# Patient Record
Sex: MALE | Race: WHITE | NOT HISPANIC OR LATINO | Employment: OTHER | URBAN - METROPOLITAN AREA
[De-identification: names, ages, dates, MRNs, and addresses within clinical notes are randomized per-mention and may not be internally consistent; named-entity substitution may affect disease eponyms.]

---

## 2017-01-09 ENCOUNTER — CONVERSION ENCOUNTER (OUTPATIENT)
Dept: GASTROENTEROLOGY | Facility: CLINIC | Age: 57
End: 2017-01-09

## 2017-01-09 ENCOUNTER — OFFICE VISIT (OUTPATIENT)
Dept: GASTROENTEROLOGY | Facility: CLINIC | Age: 57
End: 2017-01-09

## 2017-01-09 VITALS
OXYGEN SATURATION: 98 % | SYSTOLIC BLOOD PRESSURE: 160 MMHG | BODY MASS INDEX: 24.22 KG/M2 | WEIGHT: 178.8 LBS | DIASTOLIC BLOOD PRESSURE: 62 MMHG | HEART RATE: 94 BPM | HEIGHT: 72 IN | TEMPERATURE: 99.4 F

## 2017-01-09 DIAGNOSIS — Z12.11 ENCOUNTER FOR SCREENING FOR MALIGNANT NEOPLASM OF COLON: ICD-10-CM

## 2017-01-09 DIAGNOSIS — Z72.0 CURRENT TOBACCO USE: ICD-10-CM

## 2017-01-09 DIAGNOSIS — Z78.9 CURRENT DRINKER OF ALCOHOL: ICD-10-CM

## 2017-01-09 DIAGNOSIS — Z79.1 ENCOUNTER FOR LONG-TERM (CURRENT) USE OF NSAIDS: ICD-10-CM

## 2017-01-09 DIAGNOSIS — Z11.4 ENCOUNTER FOR SCREENING FOR HIV: ICD-10-CM

## 2017-01-09 DIAGNOSIS — K70.30 ALCOHOLIC CIRRHOSIS OF LIVER WITHOUT ASCITES (HCC): ICD-10-CM

## 2017-01-09 DIAGNOSIS — D69.6 THROMBOCYTOPENIA (HCC): ICD-10-CM

## 2017-01-09 DIAGNOSIS — B18.1 HEPATITIS B, CHRONIC (HCC): Primary | ICD-10-CM

## 2017-01-09 PROCEDURE — 99204 OFFICE O/P NEW MOD 45 MIN: CPT | Performed by: NURSE PRACTITIONER

## 2017-01-09 NOTE — MR AVS SNAPSHOT
Kenan Do   1/9/2017 8:30 AM   Office Visit    Dept Phone:  [phone]   Encounter #:  [id number]    Provider:  ANISH Barber   Department:  [facility] GASTROENTEROLOGY                Your Full Care Plan              Today's Medication Changes          These changes are accurate as of: 1/9/17  9:18 AM.  If you have any questions, ask your nurse or doctor.               Stop taking medication(s)listed here:     traMADol 50 MG tablet   Commonly known as:  ULTRAM   Stopped by:  ANISH Barber                      Your Updated Medication List          This list is accurate as of: 1/9/17  9:18 AM.  Always use your most recent med list.                cyclobenzaprine 10 MG tablet   Commonly known as:  FLEXERIL       folic acid 1 MG tablet   Commonly known as:  FOLVITE       furosemide 20 MG tablet   Commonly known as:  LASIX       gabapentin 300 MG capsule   Commonly known as:  NEURONTIN       HYDROcodone-acetaminophen 7.5-325 MG per tablet   Commonly known as:  NORCO       lactulose 10 GM/15ML solution   Commonly known as:  CHRONULAC       magnesium oxide 400 (241.3 MG) MG tablet tablet   Commonly known as:  MAGOX       meloxicam 7.5 MG tablet   Commonly known as:  MOBIC       metFORMIN 500 MG tablet   Commonly known as:  GLUCOPHAGE       MULTIVITAMIN ADULT PO       neomycin 500 MG tablet   Commonly known as:  MYCIFRADIN       omeprazole 20 MG capsule   Commonly known as:  priLOSEC       spironolactone 25 MG tablet   Commonly known as:  ALDACTONE       zolpidem 10 MG tablet   Commonly known as:  AMBIEN               We Performed the Following     AFP tumor marker     Ammonia     CBC and differential     Comprehensive metabolic panel     Ethanol     External [facility] Surgical/Procedural Request     Hepatitis A Antibody, Total     Hepatitis B core antibody, IgM     Hepatitis B core antibody, total     Hepatitis B DNA, ultraquantitative,  PCR     Hepatitis B e antibody     Hepatitis B e antigen     Hepatitis B surface antibody     Hepatitis B surface antigen     Hepatitis C Antibody     HIV-1 / O / 2 Ag / Antibody 4th Generation     Obtain informed consent     Protime-INR     Urine Drug Screen       You Were Diagnosed With        Codes Comments    Hepatitis B, chronic    -  Primary ICD-10-CM: B18.1  ICD-9-CM: 070.32     Encounter for screening for HIV     ICD-10-CM: Z11.4  ICD-9-CM: V73.89       Instructions    Hepatitis B  Hepatitis B is a liver infection. It is caused by a germ. This germ is passed from person to person through:  · Blood.  · Birth.  · Sex.  · Body fluids, like breast milk, tears, and spit (saliva).  HOME CARE  · Rest when you feel tired.  · Avoid alcohol.  · Take medicines only as told by your doctor.  · Do not take any medicine unless your doctor says it is okay. This includes fever or pain medicine.  · Do not have sex unless your doctor says it is okay.  · Do not share toothbrushes, nail clippers, razors, or needles with others.  GET HELP RIGHT AWAY IF:  · You are not able to eat or drink.  · You have a fever and feel sick to your stomach (nauseous) or throw up (vomit).  · You feel confused.  · Your skin or the whites of your eyes look yellow (jaundice).  · You have trouble breathing.  · You get a rash.  · Your skin, throat, mouth, or face becomes puffy (swollen).  · You start twitching or shaking (seizure).  · You are very sleepy and have trouble waking up.  MAKE SURE YOU:   · Understand these instructions.  · Will watch your condition.  · Will get help right away if you are not doing well or get worse.     This information is not intended to replace advice given to you by your health care provider. Make sure you discuss any questions you have with your health care provider.     Document Released: 05/06/2010 Document Revised: 01/08/2016 Document Reviewed: 03/27/2015  Elsevier Interactive Patient Education ©2016 Elsevier  "Inc.       Patient Instructions History      Upcoming Appointments     Visit Type Date Time Department    NEW PATIENT 1/9/2017  8:30 AM [facility] [place]    OUTSIDE FACILITY 3/8/2017  9:30 AM [facility] [place]    OFFICE VISIT 3/22/2017  1:00 PM [facility] [place]      MyChart Signup     Our records indicate that you have declined [facility] MyChart signup. If you would like to sign up for MyChart, please email [email or web address] or call [phone] to obtain an activation code.             Other Info from Your Visit           Your Appointments     Mar 08, 2017  9:30 AM EST   Outside Facility with Junior Dickerson MD   [facility] GASTROENTEROLOGY (--)    [address]  [facility] [facility] [address]   [phone]            Mar 22, 2017  1:00 PM EDT   Office Visit with ANISH Barber   [facility] GASTROENTEROLOGY (--)    [address]  [facility] [address]   [phone]           Arrive 15 minutes prior to appointment.              Allergies     No Known Allergies      Reason for Visit     Low Platelet Count           Vital Signs     Blood Pressure Pulse Temperature Height    160/62 (BP Location: Left arm, Patient Position: Sitting, Cuff Size: Adult) 94 99.4 °F (37.4 °C) (Temporal Artery ) 72\" (182.9 cm)    Weight Oxygen Saturation Body Mass Index Smoking Status    178 lb 12.8 oz (81.1 kg) 98% 24.25 kg/m2 Current Every Day Smoker      Problems and Diagnoses Noted     Chronic hepatitis B    -  Primary        "

## 2017-01-09 NOTE — PROGRESS NOTES
NEW PATIENT NOTE  Patient: DORETHA FERRARO : 1960  Date of Service: 2017  Dear Yoel Boyer and Dr.David Donald Wilson MD   Thank you for your referral of this patient for evaluation of thrombocytopenia  CC: Low Platelet Count  Assessment/Plan                                             ASSESSMENT & PLANS     Alcoholic cirrhosis of liver without ascites. Current drinker of alcohol  Hepatitis B, chronic  Thrombocytopenia related to liver cirrhosis and  long-term (current) use of NSAIDs .  No evidence of decompensation at this time (no jaundice, portal systemic encephalopathy, ascites,  questionable history of variceal bleed)   -     Schedule for EGD to evaluate for varices  -     AFP tumor marker  -     CBC and differential  -     Comprehensive metabolic panel  -     Hepatitis B core antibody, IgM  -     Hepatitis B core antibody, total  -     Hepatitis B DNA, ultraquantitative, PCR  -     Hepatitis B e antibody  -     Hepatitis B e antigen  -     Hepatitis B surface antibody  -     Hepatitis B surface antigen  -     Protime-INR  -     Ultrasound liver; Future  -     Urine Drug Screen  -     Hepatitis A Antibody, Total  -     Hepatitis C Antibody  -     Ethanol  -     Ammonia  -     HIV-1 / O / 2 Ag / Antibody 4th Generation    Encounter for screening for malignant neoplasm of colon  · Schedule for colonoscopy.  No change in bowel habit.  No family history of colon cancer    Current tobacco use    Follow Up: Return for 2 wks after scopes.    DISCUSSION:   Although patient has cut down alcohol consumption, he still consumes alcohol on a regular basis.  I encouraged patient to stop drinking altogether.  I told patient that there is no treatment to cure or reverse his liver cirrhosis.  However, we can try to slow down the progression of liver cirrhosis from developing end-stage liver failure by removing or treating the underlying cause, ie alcohol cessation and treating Hep. I encouraged patient  "to get his hepatitis B treated.  I encouraged patient using protection for sex, avoid multiple partners, and informing his prior sexual partner of his Hep B status.  .    Pt is counseled on avoiding NSAIDS products. I encouraged pt to discuss with PCP to seek alternative approach for mgt such as physical therapy, exercises, muscle relaxants, etc.  Pt is given precaution should pt continue any NSAID product.  Pt was advised that NSAIDS' potential side effects include, but limited to, gastrointestinal irritation including bleeding, thromboycytopenia, and kidney injuries. Pt was asked to take the medication with food and to stop if he experiences any GI upset. Pt is to call me if experiencing vomit, abdominal pain, or black/bloody stools.    In terms of thrombocytopenia, it is related to alcohol & Hep B liver cirrhosis and long-term NSAIDS for which I have counseled patient as mentioned above.  Pt is a high surgical risk.  However, his liver cirrhosis appears to be compensated.  Patient does not have to wait for hepatitis B to be treated prior to hip replacement. Risk vs benefit in the setting of pt's chronic pain.  Transfuse platelet prior to surgery per surgical standard of practice.     The above plan was delineated in details with patient and son and all questions and concerns were answered.  Patient is also given contact information.  Patient is to return as scheduled or sooner if new problems arise   Subjective                                                     SUBJECTIVE   History of Present Illness  Mr. Kenan Do is a 56 y.o. male presents to the clinic today for an evaluation of Low Platelet Count  Pt was told of some kind of low abnormal level before, but not sure if it was \"platelet.\"  Pt has not have a gastroenterologist/hepatologist.  Patient recently evaluated by an orthopedic surgeon Yoel Boyer, for a hip replacement.  Pre-op lab work showed thrombocytopenia.  As the result, pt is referred " "him here for further evaluation.      Pt reports of dx of Hepatitis B since 2003 of unknown source.  Patient denies of knowing how he contracted hepatitis B.  However, he admitted to having unprotected sex in the past.  He has yet been treated for hepatitis B and is not aware that there is medication for hepatitis B treatment.  He also has liver cirrhosis more than 5 yrs.   Used to drink a 5th of whiskey for many yrs. Now he reports of drinking about one case of beer a week.  Not sure of prior blood transfusion.  No tattoos    Hx of GI bleed d/t a \"tear in the esophagus\" 2010.  He underwent an EGD once at that time.  Patient does not recall of having esophageal or gastric varices.  Pt, reportedly, was seen by Dr Spurling, gastroenterologist in [place] while the patient was in the hospital, but not on an outpt basis. Has been taking Mobic daily for many years for arthritis.  Patient also has been taking Prilosec 20 mg once daily. No easy bruising.  Pt denies gum bleed, hemoptysis, hematemesis, or gross hematuria.  Pt denies SOB, chest pain, of dictation, dizziness, vertigo, syncope, fall, or near fall.Pt denies any abdominal pain, including right upper quadrant abdominal pain.  Pt denies jaundice, pruritus, stool or urine color changes, palmar erythema, or any skin changes.  He is able to have daily BMs, with occasional constipation, which he takes OTC laxative with symptoms relief.  No change in bowel habits. Pt denies dark black stools or bright red blood in the stools, in the toilet bowl, or on the toilet tissue.  No diarrhea.  Stool character & consistency have been normal. Never had screening colonoscopy.  He reports of taking lactulose PRN for constipation and not for portosystemic encephalopathy    He has an unintentional weight loss 10 lbs recently d/t not being as active due hip pain.  He previously weighed 186-187 pounds.  Today he weighed 178 pounds. Per outside medical records from PCP, Dr. Wilson, " patient's 50,000 on 3/21/16 without anemia with H&H at 13.9/41.3    ROS:Review of Systems   Constitutional: Positive for activity change, appetite change (decrease) and unexpected weight change (loss). Negative for fatigue and fever.   HENT: Negative for hearing loss, trouble swallowing and voice change.    Eyes: Negative for visual disturbance.   Respiratory: Negative for cough, choking, chest tightness and shortness of breath.    Cardiovascular: Negative for chest pain.   Gastrointestinal: Negative for abdominal distention, abdominal pain, anal bleeding, blood in stool, constipation, diarrhea, nausea, rectal pain and vomiting.   Endocrine: Negative for polydipsia and polyphagia.   Genitourinary: Negative.    Musculoskeletal: Positive for gait problem. Negative for joint swelling.   Skin: Negative for color change and rash.   Allergic/Immunologic: Negative for food allergies.   Neurological: Negative for dizziness, seizures and speech difficulty.   Hematological: Negative for adenopathy.   Psychiatric/Behavioral: Positive for sleep disturbance. Negative for confusion.     PAST MED HX: Pt  has a past medical history of Alcohol abuse; Arthritis; Cirrhosis; Constipation; Degenerative disc disease, lumbar; Diabetes mellitus (2013); GERD (gastroesophageal reflux disease); Hepatitis; Hip pain; History of anemia as a child; History of seizures; History of transfusion; Insomnia; and Wears glasses.  PAST SURG HX: Pt  has a past surgical history that includes Knee arthroscopy (Left).  FAM HX: Family history is unknown by patient.  SOC HX: Pt  reports that he has been smoking Cigarettes since age 18 with about 2 packs a wk.  He has a 19.00 pack-year smoking history. He has quit using smokeless tobacco. He is drinking about one case of beer a week. He reports that he does not use illicit drugs.  Objective                                                           OBJECTIVE   MEDS: •  cyclobenzaprine (FLEXERIL) 10 MG tablet,  Take 10 mg by mouth Daily., Disp: , Rfl:   •  folic acid (FOLVITE) 1 MG tablet, Take 1 mg by mouth Daily., Disp: , Rfl:   •  furosemide (LASIX) 20 MG tablet, Take 20 mg by mouth Daily., Disp: , Rfl:   •  gabapentin (NEURONTIN) 300 MG capsule, Take 300 mg by mouth 3 (Three) Times a Day., Disp: , Rfl:   •  HYDROcodone-acetaminophen (NORCO) 7.5-325 MG per tablet, Take 1 tablet by mouth Every 6 (Six) Hours As Needed for moderate pain (4-6)., Disp: , Rfl:   •  lactulose (CHRONULAC) 10 GM/15ML solution, Take 20 g by mouth 2 (Two) Times a Day As Needed., Disp: , Rfl:   •  magnesium oxide (MAGOX) 400 (241.3 MG) MG tablet tablet, Take 400 mg by mouth 2 (Two) Times a Day., Disp: , Rfl:   •  meloxicam (MOBIC) 7.5 MG tablet, Take 7.5 mg by mouth Daily., Disp: , Rfl:   •  metFORMIN (GLUCOPHAGE) 500 MG tablet, Take 500 mg by mouth Daily With Breakfast., Disp: , Rfl:   •  Multiple Vitamins-Minerals (MULTIVITAMIN ADULT PO), Take 1 tablet by mouth Daily., Disp: , Rfl:   •  neomycin (MYCIFRADIN) 500 MG tablet, Take 500 mg by mouth 2 (Two) Times a Day., Disp: , Rfl:   •  omeprazole (priLOSEC) 20 MG capsule, Take 20 mg by mouth Daily., Disp: , Rfl:   •  spironolactone (ALDACTONE) 25 MG tablet, Take 25 mg by mouth Daily., Disp: , Rfl:   •  zolpidem (AMBIEN) 10 MG tablet, Take 10 mg by mouth At Night As Needed for sleep., Disp: , Rfl:   Lab Results   Component Value Date    WBC 3.59 12/27/2016    HGB 13.8 12/27/2016    HCT 37.5 (L) 12/27/2016    MCV 94.9 12/27/2016    PLT 68 (L) 12/27/2016   Per outside medical record from [facility] in [place], blood work on 3/21/16: WBC 4.0 H&H 13.9/41.3 MCV 95.6 PLT 50K    Lab Results   Component Value Date    AST 77 (H) 12/27/2016    ALT 39 12/27/2016    ALKPHOS 135 (H) 12/27/2016    BILITOT 2.0 (H) 12/27/2016    ALBUMIN 4.50 12/27/2016   Per outside medical record from [facility] in [place], blood work on 3/21/16:  ALT 54 alkaline phosphatase 141 total bili 1.5  albumin 5.4 total protein 7.7 triglycerides 78 total cholesterol 224  LDL 85 serum creatinine 0.61 BUN 7 sodium 141 potassium 4.2 chloride 102, dioxide 25 calcium 9.8 uric acid 4.9    Lab Results   Component Value Date     12/27/2016    K 4.4 12/27/2016     12/27/2016    CO2 27.0 12/27/2016    BUN 11 12/27/2016    CREATININE 0.50 (L) 12/27/2016    GLUCOSE 134 (H) 12/27/2016    CALCIUM 10.9 (H) 12/27/2016    ANIONGAP 9.0 12/27/2016     Lab Results   Component Value Date    HGBA1C 4.80 12/27/2016     Wt Readings from Last 3 Encounters:   01/09/17 178 lb 12.8 oz (81.1 kg)   12/27/16 180 lb (81.6 kg)   body mass index is 24.25 kg/(m^2).,Temp: 99.4 °F (37.4 °C),BP: 160/62,Heart Rate: 94   Physical Exam  General chronically ill appearing  male who appears older than stated age.  Patient uses a wheelchair for ambulation; no acute distress.   ENT poor dentition.  Oral mucosa pink & dry without thrush or lesions.    Neck Neck supple; trachea midline. No thyromegaly   Resp CTA; no rhonchi, rales, or wheezes.  Respiration effort normal  CV RRR; normal S1, S2; no M/R/G. No lower extremity edema  GI Abd soft, NT, ND, normal active bowel sounds.  No HSM.  No abd hernia  Skin No rash; no lesions; no bruises.  Skin turgor normal  Musc No clubbing; no cyanosis.  Gait steady  Psych Oriented to time, place, and person.  Appropriate affect  Thank you kindly for allowing me to be part of this patient’s care.    Pt care team: Tawnya OCONNELL & Andi Chaudhari, [facility]--Gastroenterology  [phone]    CC: Yoel Boyer from [facility]    Dr.David Donald Wilson MD  [address] [place] [address] [address] / [place] [id number] FAX:[phone]

## 2017-01-09 NOTE — PATIENT INSTRUCTIONS
Avoid NSAIDs (Non-Steroid Anti-Inflammatory Drugs) products (i.e Ibuprofen, Aleve, Advil, Exedrin, BC Powder, diclofenac, meloxicam, & Naproxen, etc). Talk to your doctor/medical provider for alternative approach for management such as physical therapy, exercises, muscle relaxants, etc.     In some cases, your doctor will have to keep you on NSAIDS products to treat your other medical conditions.  Should you need to continue any NSAID product,  please continue to take your acid reducer.  Be be aware of long-term and frequent NSAIDS' potential side effects. These include, but limited to, stomach ulcer, bleeding risks, and kidney injury. Take the medication with food and to stop if you experience any GI upset. Call if you have vomitting, abdominal pain, or black/bloody stools.     Hepatitis B  Hepatitis B is a liver infection. It is caused by a germ. This germ is passed from person to person through:  · Blood.  · Birth.  · Sex.  · Body fluids, like breast milk, tears, and spit (saliva).  HOME CARE  · Rest when you feel tired.  · Avoid alcohol.  · Take medicines only as told by your doctor.  · Do not take any medicine unless your doctor says it is okay. This includes fever or pain medicine.  · Do not have sex unless your doctor says it is okay.  · Do not share toothbrushes, nail clippers, razors, or needles with others.  GET HELP RIGHT AWAY IF:  · You are not able to eat or drink.  · You have a fever and feel sick to your stomach (nauseous) or throw up (vomit).  · You feel confused.  · Your skin or the whites of your eyes look yellow (jaundice).  · You have trouble breathing.  · You get a rash.  · Your skin, throat, mouth, or face becomes puffy (swollen).  · You start twitching or shaking (seizure).  · You are very sleepy and have trouble waking up.  MAKE SURE YOU:   · Understand these instructions.  · Will watch your condition.  · Will get help right away if you are not doing well or get worse.     This information  is not intended to replace advice given to you by your health care provider. Make sure you discuss any questions you have with your health care provider.     Document Released: 05/06/2010 Document Revised: 01/08/2016 Document Reviewed: 03/27/2015  [employer] Interactive Patient Education ©2016 Elsevier Inc.

## 2017-01-09 NOTE — LETTER
2017     William Wilson MD  [address] [facility]   [address]  [facility] [address]    Patient: Kenan Ferraro   YOB: 1960   Date of Visit: 2017       Dear Dr. Steve MD:    Thank you for referring Kenan Ferraro to me for evaluation. Below is a copy of my consult note.    If you have questions, please do not hesitate to call me. I look forward to following Kenan along with you.         Sincerely,        ANISH Barber        CC: Yoel Brewster MD        NEW PATIENT NOTE  Patient: KENAN FERRARO : 1960  Date of Service: 2017  Dear Yoel Boyer and Dr.David Donald Wilson MD   Thank you for your referral of this patient for evaluation of thrombocytopenia  CC: Low Platelet Count  Assessment/Plan                                             ASSESSMENT & PLANS     Alcoholic cirrhosis of liver without ascites. Current drinker of alcohol  Hepatitis B, chronic  Thrombocytopenia related to liver cirrhosis and  long-term (current) use of NSAIDs .  No evidence of decompensation at this time (no jaundice, portal systemic encephalopathy, ascites,  questionable history of variceal bleed)   -     Schedule for EGD to evaluate for varices  -     AFP tumor marker  -     CBC and differential  -     Comprehensive metabolic panel  -     Hepatitis B core antibody, IgM  -     Hepatitis B core antibody, total  -     Hepatitis B DNA, ultraquantitative, PCR  -     Hepatitis B e antibody  -     Hepatitis B e antigen  -     Hepatitis B surface antibody  -     Hepatitis B surface antigen  -     Protime-INR  -     Ultrasound liver; Future  -     Urine Drug Screen  -     Hepatitis A Antibody, Total  -     Hepatitis C Antibody  -     Ethanol  -     Ammonia  -     HIV-1 / O / 2 Ag / Antibody 4th Generation    Encounter for screening for malignant neoplasm of colon  · Schedule for colonoscopy.  No change in bowel habit.  No family history of colon cancer    Current tobacco  use    Follow Up: Return for 2 wks after scopes.    DISCUSSION:   Although patient has cut down alcohol consumption, he still consumes alcohol on a regular basis.  I encouraged patient to stop drinking altogether.  I told patient that there is no treatment to cure or reverse his liver cirrhosis.  However, we can try to slow down the progression of liver cirrhosis from developing end-stage liver failure by removing or treating the underlying cause, ie alcohol cessation and treating Hep. I encouraged patient to get his hepatitis B treated.  I encouraged patient using protection for sex, avoid multiple partners, and informing his prior sexual partner of his Hep B status.  .    Pt is counseled on avoiding NSAIDS products. I encouraged pt to discuss with PCP to seek alternative approach for mgt such as physical therapy, exercises, muscle relaxants, etc.  Pt is given precaution should pt continue any NSAID product.  Pt was advised that NSAIDS' potential side effects include, but limited to, gastrointestinal irritation including bleeding, thromboycytopenia, and kidney injuries. Pt was asked to take the medication with food and to stop if he experiences any GI upset. Pt is to call me if experiencing vomit, abdominal pain, or black/bloody stools.    In terms of thrombocytopenia, it is related to alcohol & Hep B liver cirrhosis and long-term NSAIDS for which I have counseled patient as mentioned above.  Pt is a high surgical risk.  However, his liver cirrhosis appears to be compensated.  Patient does not have to wait for hepatitis B to be treated prior to hip replacement. Risk vs benefit in the setting of pt's chronic pain.  Transfuse platelet prior to surgery per surgical standard of practice.     The above plan was delineated in details with patient and son and all questions and concerns were answered.  Patient is also given contact information.  Patient is to return as scheduled or sooner if new problems arise   Subjective  "                                                    SUBJECTIVE   History of Present Illness  Mr. Kenan Do is a 56 y.o. male presents to the clinic today for an evaluation of Low Platelet Count  Pt was told of some kind of low abnormal level before, but not sure if it was \"platelet.\"  Pt has not have a gastroenterologist/hepatologist.  Patient recently evaluated by an orthopedic surgeon Yoel Boyer, for a hip replacement.  Pre-op lab work showed thrombocytopenia.  As the result, pt is referred him here for further evaluation.      Pt reports of dx of Hepatitis B since 2003 of unknown source.  Patient denies of knowing how he contracted hepatitis B.  However, he admitted to having unprotected sex in the past.  He has yet been treated for hepatitis B and is not aware that there is medication for hepatitis B treatment.  He also has liver cirrhosis more than 5 yrs.   Used to drink a 5th of whiskey for many yrs. Now he reports of drinking about one case of beer a week.  Not sure of prior blood transfusion.  No tattoos    Hx of GI bleed d/t a \"tear in the esophagus\" 2010.  He underwent an EGD once at that time.  Patient does not recall of having esophageal or gastric varices.  Pt, reportedly, was seen by Dr Spurling, gastroenterologist in [place] while the patient was in the hospital, but not on an outpt basis. Has been taking Mobic daily for many years for arthritis.  Patient also has been taking Prilosec 20 mg once daily. No easy bruising.  Pt denies gum bleed, hemoptysis, hematemesis, or gross hematuria.  Pt denies SOB, chest pain, of dictation, dizziness, vertigo, syncope, fall, or near fall.Pt denies any abdominal pain, including right upper quadrant abdominal pain.  Pt denies jaundice, pruritus, stool or urine color changes, palmar erythema, or any skin changes.  He is able to have daily BMs, with occasional constipation, which he takes OTC laxative with symptoms relief.  No change in bowel habits. Pt " denies dark black stools or bright red blood in the stools, in the toilet bowl, or on the toilet tissue.  No diarrhea.  Stool character & consistency have been normal. Never had screening colonoscopy.  He reports of taking lactulose PRN for constipation and not for portosystemic encephalopathy    He has an unintentional weight loss 10 lbs recently d/t not being as active due hip pain.  He previously weighed 186-187 pounds.  Today he weighed 178 pounds. Per outside medical records from PCP, Dr. Wilson, patient's 50,000 on 3/21/16 without anemia with H&H at 13.9/41.3    ROS:Review of Systems   Constitutional: Positive for activity change, appetite change (decrease) and unexpected weight change (loss). Negative for fatigue and fever.   HENT: Negative for hearing loss, trouble swallowing and voice change.    Eyes: Negative for visual disturbance.   Respiratory: Negative for cough, choking, chest tightness and shortness of breath.    Cardiovascular: Negative for chest pain.   Gastrointestinal: Negative for abdominal distention, abdominal pain, anal bleeding, blood in stool, constipation, diarrhea, nausea, rectal pain and vomiting.   Endocrine: Negative for polydipsia and polyphagia.   Genitourinary: Negative.    Musculoskeletal: Positive for gait problem. Negative for joint swelling.   Skin: Negative for color change and rash.   Allergic/Immunologic: Negative for food allergies.   Neurological: Negative for dizziness, seizures and speech difficulty.   Hematological: Negative for adenopathy.   Psychiatric/Behavioral: Positive for sleep disturbance. Negative for confusion.     PAST MED HX: Pt  has a past medical history of Alcohol abuse; Arthritis; Cirrhosis; Constipation; Degenerative disc disease, lumbar; Diabetes mellitus (2013); GERD (gastroesophageal reflux disease); Hepatitis; Hip pain; History of anemia as a child; History of seizures; History of transfusion; Insomnia; and Wears glasses.  PAST SURG HX: Pt  has a  past surgical history that includes Knee arthroscopy (Left).  FAM HX: Family history is unknown by patient.  SOC HX: Pt  reports that he has been smoking Cigarettes since age 18 with about 2 packs a wk.  He has a 19.00 pack-year smoking history. He has quit using smokeless tobacco. He is drinking about one case of beer a week. He reports that he does not use illicit drugs.  Objective                                                           OBJECTIVE   MEDS: •  cyclobenzaprine (FLEXERIL) 10 MG tablet, Take 10 mg by mouth Daily., Disp: , Rfl:   •  folic acid (FOLVITE) 1 MG tablet, Take 1 mg by mouth Daily., Disp: , Rfl:   •  furosemide (LASIX) 20 MG tablet, Take 20 mg by mouth Daily., Disp: , Rfl:   •  gabapentin (NEURONTIN) 300 MG capsule, Take 300 mg by mouth 3 (Three) Times a Day., Disp: , Rfl:   •  HYDROcodone-acetaminophen (NORCO) 7.5-325 MG per tablet, Take 1 tablet by mouth Every 6 (Six) Hours As Needed for moderate pain (4-6)., Disp: , Rfl:   •  lactulose (CHRONULAC) 10 GM/15ML solution, Take 20 g by mouth 2 (Two) Times a Day As Needed., Disp: , Rfl:   •  magnesium oxide (MAGOX) 400 (241.3 MG) MG tablet tablet, Take 400 mg by mouth 2 (Two) Times a Day., Disp: , Rfl:   •  meloxicam (MOBIC) 7.5 MG tablet, Take 7.5 mg by mouth Daily., Disp: , Rfl:   •  metFORMIN (GLUCOPHAGE) 500 MG tablet, Take 500 mg by mouth Daily With Breakfast., Disp: , Rfl:   •  Multiple Vitamins-Minerals (MULTIVITAMIN ADULT PO), Take 1 tablet by mouth Daily., Disp: , Rfl:   •  neomycin (MYCIFRADIN) 500 MG tablet, Take 500 mg by mouth 2 (Two) Times a Day., Disp: , Rfl:   •  omeprazole (priLOSEC) 20 MG capsule, Take 20 mg by mouth Daily., Disp: , Rfl:   •  spironolactone (ALDACTONE) 25 MG tablet, Take 25 mg by mouth Daily., Disp: , Rfl:   •  zolpidem (AMBIEN) 10 MG tablet, Take 10 mg by mouth At Night As Needed for sleep., Disp: , Rfl:   Lab Results   Component Value Date    WBC 3.59 12/27/2016    HGB 13.8 12/27/2016    HCT 37.5 (L)  12/27/2016    MCV 94.9 12/27/2016    PLT 68 (L) 12/27/2016   Per outside medical record from [facility] in [place], blood work on 3/21/16: WBC 4.0 H&H 13.9/41.3 MCV 95.6 PLT 50K    Lab Results   Component Value Date    AST 77 (H) 12/27/2016    ALT 39 12/27/2016    ALKPHOS 135 (H) 12/27/2016    BILITOT 2.0 (H) 12/27/2016    ALBUMIN 4.50 12/27/2016   Per outside medical record from [facility] in [place], blood work on 3/21/16:  ALT 54 alkaline phosphatase 141 total bili 1.5 albumin 5.4 total protein 7.7 triglycerides 78 total cholesterol 224  LDL 85 serum creatinine 0.61 BUN 7 sodium 141 potassium 4.2 chloride 102, dioxide 25 calcium 9.8 uric acid 4.9    Lab Results   Component Value Date     12/27/2016    K 4.4 12/27/2016     12/27/2016    CO2 27.0 12/27/2016    BUN 11 12/27/2016    CREATININE 0.50 (L) 12/27/2016    GLUCOSE 134 (H) 12/27/2016    CALCIUM 10.9 (H) 12/27/2016    ANIONGAP 9.0 12/27/2016     Lab Results   Component Value Date    HGBA1C 4.80 12/27/2016     Wt Readings from Last 3 Encounters:   01/09/17 178 lb 12.8 oz (81.1 kg)   12/27/16 180 lb (81.6 kg)   body mass index is 24.25 kg/(m^2).,Temp: 99.4 °F (37.4 °C),BP: 160/62,Heart Rate: 94   Physical Exam  General chronically ill appearing  male who appears older than stated age.  Patient uses a wheelchair for ambulation; no acute distress.   ENT poor dentition.  Oral mucosa pink & dry without thrush or lesions.    Neck Neck supple; trachea midline. No thyromegaly   Resp CTA; no rhonchi, rales, or wheezes.  Respiration effort normal  CV RRR; normal S1, S2; no M/R/G. No lower extremity edema  GI Abd soft, NT, ND, normal active bowel sounds.  No HSM.  No abd hernia  Skin No rash; no lesions; no bruises.  Skin turgor normal  Musc No clubbing; no cyanosis.  Gait steady  Psych Oriented to time, place, and person.  Appropriate affect  Thank you kindly for allowing me to be part of this patient’s  care.    Pt care team: Tawnya OCONNELL & TATA Smith  [facility]--Gastroenterology  [phone]    CC: Yoel Boyer from [facility]    Dr.David Donald Wilson MD  [address] [place] [address] DR [address]ET[address] [facility] / [place] [id number] FAX:[phone]

## 2017-01-10 LAB
AMPHETAMINES UR QL SCN: NEGATIVE NG/ML
BARBITURATES UR QL SCN: NEGATIVE NG/ML
BENZODIAZ UR QL SCN: NEGATIVE NG/ML
BZE UR QL SCN: NEGATIVE NG/ML
CANNABINOIDS UR QL SCN: NEGATIVE NG/ML
Lab: ABNORMAL
MDMA UR QL SCN: NEGATIVE NG/ML
METHADONE UR QL SCN: NEGATIVE NG/ML
OPIATES UR QL SCN: POSITIVE NG/ML
OXYCODONE+OXYMORPHONE UR QL SCN: NEGATIVE NG/ML
PCP UR QL SCN: NEGATIVE NG/ML

## 2017-01-11 LAB
AFP-TM SERPL-MCNC: 4.6 NG/ML (ref 0–8.3)
HAV AB SER QL IA: POSITIVE
HBV CORE AB SERPL QL IA: NEGATIVE
HBV CORE IGM SERPL QL IA: NEGATIVE
HBV DNA SERPL NAA+PROBE-ACNC: NORMAL IU/ML
HBV DNA SERPL NAA+PROBE-LOG IU: NORMAL LOG10IU/ML
HBV E AB SERPL QL IA: NEGATIVE
HBV E AG SERPL QL IA: NEGATIVE
HBV SURFACE AB SER QL: NON REACTIVE
HBV SURFACE AG SERPL QL IA: NEGATIVE
HCV AB S/CO SERPL IA: <0.1 S/CO RATIO (ref 0–0.9)
REF LAB TEST REF RANGE: NORMAL

## 2017-01-12 LAB
ALBUMIN SERPL-MCNC: 4.3 G/DL (ref 3.2–4.8)
ALBUMIN/GLOB SERPL: 1.6 G/DL
ALP SERPL-CCNC: 121 U/L (ref 25–100)
ALT SERPL-CCNC: 37 U/L (ref 7–40)
AMMONIA PLAS-MCNC: 31 UMOL/L (ref 19–60)
AST SERPL-CCNC: 75 U/L (ref 0–33)
BASOPHILS # BLD AUTO: 0.02 10E3/MM3 (ref 0–0.2)
BASOPHILS NFR BLD AUTO: 0.5 % (ref 0–1)
BILIRUB SERPL-MCNC: 2 MG/DL (ref 0.3–1.2)
BUN SERPL-MCNC: 7 MG/DL (ref 9–23)
BUN/CREAT SERPL: 14 (ref 7–25)
CALCIUM SERPL-MCNC: 10.3 MG/DL (ref 8.7–10.4)
CHLORIDE SERPL-SCNC: 104 MMOL/L (ref 99–109)
CO2 SERPL-SCNC: 26 MMOL/L (ref 20–31)
CREAT SERPL-MCNC: 0.5 MG/DL (ref 0.6–1.3)
EOSINOPHIL # BLD AUTO: 0.05 10E3/MM3 (ref 0.1–0.3)
EOSINOPHIL NFR BLD AUTO: 1.3 % (ref 0–3)
ERYTHROCYTE [DISTWIDTH] IN BLOOD BY AUTOMATED COUNT: 13 % (ref 11.3–14.5)
ETHANOL BLD GC-MCNC: NORMAL %
GLOBULIN SER CALC-MCNC: 2.7 G/DL
GLUCOSE SERPL-MCNC: 109 MG/DL (ref 70–100)
HCT VFR BLD AUTO: 36.6 % (ref 38.9–50.9)
HGB BLD-MCNC: 13.6 G/DL (ref 13.1–17.5)
IMM GRANULOCYTES # BLD: 0 10E3/MM3 (ref 0–0.03)
IMM GRANULOCYTES NFR BLD: 0 % (ref 0–0.6)
INR PPP: 1.08
LYMPHOCYTES # BLD AUTO: 0.74 10E3/MM3 (ref 0.6–4.8)
LYMPHOCYTES NFR BLD AUTO: 19.8 % (ref 24–44)
MCH RBC QN AUTO: 35.3 PG (ref 27–31)
MCHC RBC AUTO-ENTMCNC: 37.2 G/DL (ref 32–36)
MCV RBC AUTO: 95.1 FL (ref 80–99)
MONOCYTES # BLD AUTO: 0.49 10E3/MM3 (ref 0–1)
MONOCYTES NFR BLD AUTO: 13.1 % (ref 0–12)
NEUTROPHILS # BLD AUTO: 2.44 10E3/MM3 (ref 1.5–8.3)
NEUTROPHILS NFR BLD AUTO: 65.3 % (ref 41–71)
PLATELET # BLD AUTO: 61 10E3/MM3 (ref 150–450)
POTASSIUM SERPL-SCNC: 3.7 MMOL/L (ref 3.5–5.5)
PROT SERPL-MCNC: 7 G/DL (ref 5.7–8.2)
PROTHROMBIN TIME: 11.8 SECONDS (ref 9.6–11.5)
RBC # BLD AUTO: 3.85 10E6/MM3 (ref 4.2–5.76)
SODIUM SERPL-SCNC: 142 MMOL/L (ref 132–146)
WBC # BLD AUTO: 3.74 10E3/MM3 (ref 3.5–10.8)

## 2017-01-19 ENCOUNTER — TELEPHONE (OUTPATIENT)
Dept: GASTROENTEROLOGY | Facility: CLINIC | Age: 57
End: 2017-01-19

## 2017-01-19 NOTE — TELEPHONE ENCOUNTER
Pt is made aware of his severe thrombocytopenia persists (though not worsened compared to prior reading) and that he is to notify of there is any evidence of an, suggest gross hematuria, but his stool, epistaxis, hematemesis, uncontrolled bleeding, etc.      Patient reports that he wants to get his hip surgery for each prior to doing endoscopy procedures due to his chronic hip pain.  I again advised patient to follow up with Dr. Brewster. Pt is encouraged to keep up with his EGD and colonoscopy appts and get them done ASAP after hip surgery.    Pt verbalized understanding

## 2017-03-01 ENCOUNTER — APPOINTMENT (OUTPATIENT)
Dept: PREADMISSION TESTING | Facility: HOSPITAL | Age: 57
End: 2017-03-01

## 2017-03-01 VITALS — HEIGHT: 72 IN | BODY MASS INDEX: 23.92 KG/M2 | WEIGHT: 176.59 LBS

## 2017-03-01 LAB
ALBUMIN SERPL-MCNC: 4.3 G/DL (ref 3.2–4.8)
ALBUMIN/GLOB SERPL: 1.7 G/DL (ref 1.5–2.5)
ALP SERPL-CCNC: 106 U/L (ref 25–100)
ALT SERPL W P-5'-P-CCNC: 37 U/L (ref 7–40)
ANION GAP SERPL CALCULATED.3IONS-SCNC: 5 MMOL/L (ref 3–11)
AST SERPL-CCNC: 55 U/L (ref 0–33)
BILIRUB SERPL-MCNC: 3.3 MG/DL (ref 0.3–1.2)
BUN BLD-MCNC: 7 MG/DL (ref 9–23)
BUN/CREAT SERPL: 14 (ref 7–25)
CALCIUM SPEC-SCNC: 10.7 MG/DL (ref 8.7–10.4)
CHLORIDE SERPL-SCNC: 100 MMOL/L (ref 99–109)
CO2 SERPL-SCNC: 30 MMOL/L (ref 20–31)
CREAT BLD-MCNC: 0.5 MG/DL (ref 0.6–1.3)
DEPRECATED RDW RBC AUTO: 41 FL (ref 37–54)
ERYTHROCYTE [DISTWIDTH] IN BLOOD BY AUTOMATED COUNT: 11.9 % (ref 11.3–14.5)
GFR SERPL CREATININE-BSD FRML MDRD: >150 ML/MIN/1.73
GLOBULIN UR ELPH-MCNC: 2.5 GM/DL
GLUCOSE BLD-MCNC: 128 MG/DL (ref 70–100)
HBA1C MFR BLD: 4.8 % (ref 4.8–5.6)
HCT VFR BLD AUTO: 36.7 % (ref 38.9–50.9)
HGB BLD-MCNC: 13.1 G/DL (ref 13.1–17.5)
MCH RBC QN AUTO: 33.9 PG (ref 27–31)
MCHC RBC AUTO-ENTMCNC: 35.7 G/DL (ref 32–36)
MCV RBC AUTO: 94.8 FL (ref 80–99)
PLATELET # BLD AUTO: 64 10*3/MM3 (ref 150–450)
PMV BLD AUTO: 9.8 FL (ref 6–12)
POTASSIUM BLD-SCNC: 3.8 MMOL/L (ref 3.5–5.5)
PROT SERPL-MCNC: 6.8 G/DL (ref 5.7–8.2)
RBC # BLD AUTO: 3.87 10*6/MM3 (ref 4.2–5.76)
SODIUM BLD-SCNC: 135 MMOL/L (ref 132–146)
WBC NRBC COR # BLD: 3.35 10*3/MM3 (ref 3.5–10.8)

## 2017-03-01 PROCEDURE — 85027 COMPLETE CBC AUTOMATED: CPT | Performed by: ORTHOPAEDIC SURGERY

## 2017-03-01 PROCEDURE — 80053 COMPREHEN METABOLIC PANEL: CPT | Performed by: ORTHOPAEDIC SURGERY

## 2017-03-01 PROCEDURE — 36415 COLL VENOUS BLD VENIPUNCTURE: CPT

## 2017-03-01 PROCEDURE — 83036 HEMOGLOBIN GLYCOSYLATED A1C: CPT | Performed by: ORTHOPAEDIC SURGERY

## 2017-03-01 RX ORDER — LACTULOSE 10 G/15ML
20 SOLUTION ORAL 2 TIMES DAILY PRN
COMMUNITY

## 2017-03-01 RX ORDER — ZOLPIDEM TARTRATE 10 MG/1
10 TABLET ORAL NIGHTLY PRN
COMMUNITY

## 2017-03-01 RX ORDER — HYDROCODONE BITARTRATE AND ACETAMINOPHEN 7.5; 325 MG/1; MG/1
1 TABLET ORAL EVERY 6 HOURS PRN
COMMUNITY
End: 2017-03-07 | Stop reason: HOSPADM

## 2017-03-01 RX ORDER — NEOMYCIN SULFATE 500 MG/1
500 TABLET ORAL 2 TIMES DAILY
COMMUNITY

## 2017-03-01 RX ORDER — OMEPRAZOLE 20 MG/1
20 CAPSULE, DELAYED RELEASE ORAL DAILY
COMMUNITY

## 2017-03-01 RX ORDER — SPIRONOLACTONE 25 MG/1
25 TABLET ORAL DAILY
COMMUNITY

## 2017-03-01 RX ORDER — FOLIC ACID 1 MG/1
1 TABLET ORAL DAILY
COMMUNITY

## 2017-03-01 RX ORDER — GABAPENTIN 300 MG/1
300 CAPSULE ORAL 3 TIMES DAILY
COMMUNITY

## 2017-03-01 RX ORDER — CYCLOBENZAPRINE HCL 10 MG
10 TABLET ORAL 3 TIMES DAILY PRN
COMMUNITY

## 2017-03-01 RX ORDER — FUROSEMIDE 20 MG/1
20 TABLET ORAL DAILY
COMMUNITY

## 2017-03-01 NOTE — PAT
Too early for type and screen; please draw the morning of surgery   Fazal gil measurements:  Length: 23  Width: 14  Patient attended joint class in Dec 2016  Patient denies any dysuria, flank pain, or urine frequency

## 2017-03-05 ENCOUNTER — ANESTHESIA EVENT (OUTPATIENT)
Dept: PERIOP | Facility: HOSPITAL | Age: 57
End: 2017-03-05

## 2017-03-05 RX ORDER — FAMOTIDINE 10 MG/ML
20 INJECTION, SOLUTION INTRAVENOUS ONCE
Status: CANCELLED | OUTPATIENT
Start: 2017-03-05 | End: 2017-03-05

## 2017-03-06 ENCOUNTER — ANESTHESIA (OUTPATIENT)
Dept: PERIOP | Facility: HOSPITAL | Age: 57
End: 2017-03-06

## 2017-03-06 ENCOUNTER — APPOINTMENT (OUTPATIENT)
Dept: GENERAL RADIOLOGY | Facility: HOSPITAL | Age: 57
End: 2017-03-06

## 2017-03-06 ENCOUNTER — HOSPITAL ENCOUNTER (INPATIENT)
Facility: HOSPITAL | Age: 57
LOS: 1 days | Discharge: HOME HEALTH | End: 2017-03-07
Attending: ORTHOPAEDIC SURGERY | Admitting: ORTHOPAEDIC SURGERY

## 2017-03-06 DIAGNOSIS — Z96.641 STATUS POST TOTAL HIP REPLACEMENT, RIGHT: ICD-10-CM

## 2017-03-06 DIAGNOSIS — Z74.09 IMPAIRED MOBILITY AND ADLS: ICD-10-CM

## 2017-03-06 DIAGNOSIS — Z74.09 IMPAIRED FUNCTIONAL MOBILITY, BALANCE, GAIT, AND ENDURANCE: Primary | ICD-10-CM

## 2017-03-06 DIAGNOSIS — Z78.9 IMPAIRED MOBILITY AND ADLS: ICD-10-CM

## 2017-03-06 PROBLEM — K74.60 CIRRHOSIS (HCC): Status: ACTIVE | Noted: 2017-03-06

## 2017-03-06 PROBLEM — M16.11 ARTHRITIS OF RIGHT HIP: Status: ACTIVE | Noted: 2017-03-06

## 2017-03-06 PROBLEM — Z72.0 TOBACCO ABUSE: Status: ACTIVE | Noted: 2017-03-06

## 2017-03-06 PROBLEM — K21.9 GERD (GASTROESOPHAGEAL REFLUX DISEASE): Status: ACTIVE | Noted: 2017-03-06

## 2017-03-06 PROBLEM — F10.10 ALCOHOL ABUSE: Status: ACTIVE | Noted: 2017-03-06

## 2017-03-06 PROBLEM — E11.9 DM (DIABETES MELLITUS) (HCC): Status: ACTIVE | Noted: 2017-03-06

## 2017-03-06 LAB
ABO GROUP BLD: NORMAL
ANION GAP SERPL CALCULATED.3IONS-SCNC: 10 MMOL/L (ref 3–11)
BLD GP AB SCN SERPL QL: NEGATIVE
BUN BLD-MCNC: 6 MG/DL (ref 9–23)
BUN/CREAT SERPL: 12 (ref 7–25)
CALCIUM SPEC-SCNC: 9.2 MG/DL (ref 8.7–10.4)
CHLORIDE SERPL-SCNC: 106 MMOL/L (ref 99–109)
CO2 SERPL-SCNC: 23 MMOL/L (ref 20–31)
CREAT BLD-MCNC: 0.5 MG/DL (ref 0.6–1.3)
DEPRECATED RDW RBC AUTO: 43.7 FL (ref 37–54)
ERYTHROCYTE [DISTWIDTH] IN BLOOD BY AUTOMATED COUNT: 12.3 % (ref 11.3–14.5)
FIBRINOGEN PPP-MCNC: 136 MG/DL (ref 200–400)
GFR SERPL CREATININE-BSD FRML MDRD: >150 ML/MIN/1.73
GLUCOSE BLD-MCNC: 128 MG/DL (ref 70–100)
GLUCOSE BLDC GLUCOMTR-MCNC: 124 MG/DL (ref 70–130)
GLUCOSE BLDC GLUCOMTR-MCNC: 144 MG/DL (ref 70–130)
GLUCOSE BLDC GLUCOMTR-MCNC: 233 MG/DL (ref 70–130)
HCT VFR BLD AUTO: 29 % (ref 38.9–50.9)
HGB BLD-MCNC: 10 G/DL (ref 13.1–17.5)
MCH RBC QN AUTO: 33.3 PG (ref 27–31)
MCHC RBC AUTO-ENTMCNC: 34.5 G/DL (ref 32–36)
MCV RBC AUTO: 96.7 FL (ref 80–99)
PLATELET # BLD AUTO: 78 10*3/MM3 (ref 150–450)
PMV BLD AUTO: 9.3 FL (ref 6–12)
POTASSIUM BLD-SCNC: 4 MMOL/L (ref 3.5–5.5)
RBC # BLD AUTO: 3 10*6/MM3 (ref 4.2–5.76)
RH BLD: POSITIVE
SODIUM BLD-SCNC: 139 MMOL/L (ref 132–146)
WBC NRBC COR # BLD: 5.65 10*3/MM3 (ref 3.5–10.8)

## 2017-03-06 PROCEDURE — 36430 TRANSFUSION BLD/BLD COMPNT: CPT

## 2017-03-06 PROCEDURE — 25010000002 PHENYLEPHRINE PER 1 ML: Performed by: NURSE ANESTHETIST, CERTIFIED REGISTERED

## 2017-03-06 PROCEDURE — 63710000001 INSULIN LISPRO (HUMAN) PER 5 UNITS: Performed by: NURSE PRACTITIONER

## 2017-03-06 PROCEDURE — 25010000003 CEFAZOLIN IN DEXTROSE 2-4 GM/100ML-% SOLUTION: Performed by: ORTHOPAEDIC SURGERY

## 2017-03-06 PROCEDURE — 86920 COMPATIBILITY TEST SPIN: CPT

## 2017-03-06 PROCEDURE — 25010000002 KETOROLAC TROMETHAMINE PER 15 MG: Performed by: ORTHOPAEDIC SURGERY

## 2017-03-06 PROCEDURE — 25010000002 MIDAZOLAM PER 1 MG: Performed by: NURSE ANESTHETIST, CERTIFIED REGISTERED

## 2017-03-06 PROCEDURE — 73502 X-RAY EXAM HIP UNI 2-3 VIEWS: CPT

## 2017-03-06 PROCEDURE — 86900 BLOOD TYPING SEROLOGIC ABO: CPT | Performed by: ORTHOPAEDIC SURGERY

## 2017-03-06 PROCEDURE — 25010000002 PROPOFOL 10 MG/ML EMULSION: Performed by: NURSE ANESTHETIST, CERTIFIED REGISTERED

## 2017-03-06 PROCEDURE — 86927 PLASMA FRESH FROZEN: CPT

## 2017-03-06 PROCEDURE — P9035 PLATELET PHERES LEUKOREDUCED: HCPCS

## 2017-03-06 PROCEDURE — P9017 PLASMA 1 DONOR FRZ W/IN 8 HR: HCPCS

## 2017-03-06 PROCEDURE — 25010000002 FENTANYL CITRATE (PF) 100 MCG/2ML SOLUTION: Performed by: NURSE ANESTHETIST, CERTIFIED REGISTERED

## 2017-03-06 PROCEDURE — 82962 GLUCOSE BLOOD TEST: CPT

## 2017-03-06 PROCEDURE — 25010000002 HYDROMORPHONE PER 4 MG: Performed by: NURSE ANESTHETIST, CERTIFIED REGISTERED

## 2017-03-06 PROCEDURE — 25010000002 HYDROMORPHONE PER 4 MG: Performed by: ORTHOPAEDIC SURGERY

## 2017-03-06 PROCEDURE — 80048 BASIC METABOLIC PNL TOTAL CA: CPT | Performed by: NURSE ANESTHETIST, CERTIFIED REGISTERED

## 2017-03-06 PROCEDURE — 25010000002 ROPIVACAINE PER 1 MG: Performed by: ORTHOPAEDIC SURGERY

## 2017-03-06 PROCEDURE — 86901 BLOOD TYPING SEROLOGIC RH(D): CPT | Performed by: ORTHOPAEDIC SURGERY

## 2017-03-06 PROCEDURE — 85384 FIBRINOGEN ACTIVITY: CPT | Performed by: NURSE ANESTHETIST, CERTIFIED REGISTERED

## 2017-03-06 PROCEDURE — 86850 RBC ANTIBODY SCREEN: CPT | Performed by: ORTHOPAEDIC SURGERY

## 2017-03-06 PROCEDURE — 85027 COMPLETE CBC AUTOMATED: CPT | Performed by: NURSE ANESTHETIST, CERTIFIED REGISTERED

## 2017-03-06 PROCEDURE — 25010000002 ALBUMIN HUMAN 5% PER 50 ML: Performed by: NURSE ANESTHETIST, CERTIFIED REGISTERED

## 2017-03-06 PROCEDURE — 0SR902A REPLACEMENT OF RIGHT HIP JOINT WITH METAL ON POLYETHYLENE SYNTHETIC SUBSTITUTE, UNCEMENTED, OPEN APPROACH: ICD-10-PCS | Performed by: ORTHOPAEDIC SURGERY

## 2017-03-06 PROCEDURE — P9041 ALBUMIN (HUMAN),5%, 50ML: HCPCS | Performed by: NURSE ANESTHETIST, CERTIFIED REGISTERED

## 2017-03-06 PROCEDURE — C1776 JOINT DEVICE (IMPLANTABLE): HCPCS | Performed by: ORTHOPAEDIC SURGERY

## 2017-03-06 PROCEDURE — 76000 FLUOROSCOPY <1 HR PHYS/QHP: CPT

## 2017-03-06 DEVICE — PINNACLE GRIPTION ACETABULAR SHELL MULTI-HOLE 56MM OD
Type: IMPLANTABLE DEVICE | Site: HIP | Status: FUNCTIONAL
Brand: PINNACLE GRIPTION

## 2017-03-06 DEVICE — PINNACLE HIP SOLUTIONS ALTRX POLYETHYLENE ACETABULAR LINER NEUTRAL 36MM ID 56MM OD
Type: IMPLANTABLE DEVICE | Site: HIP | Status: FUNCTIONAL
Brand: PINNACLE ALTRX

## 2017-03-06 DEVICE — CORAIL HIP SYSTEM CEMENTLESS FEMORAL STEM 12/14 AMT 135 DEGREES KA SIZE 12 HA COATED STANDARD COLLAR
Type: IMPLANTABLE DEVICE | Site: HIP | Status: FUNCTIONAL
Brand: CORAIL

## 2017-03-06 DEVICE — M-SPEC METAL FEMORAL HEAD 12/14 TAPER DIAMETER 36MM -2: Type: IMPLANTABLE DEVICE | Site: HIP | Status: FUNCTIONAL

## 2017-03-06 DEVICE — PINNACLE CANCELLOUS BONE SCREW 6.5MM X 25MM
Type: IMPLANTABLE DEVICE | Site: HIP | Status: FUNCTIONAL
Brand: PINNACLE

## 2017-03-06 DEVICE — PINNACLE CANCELLOUS BONE SCREW 6.5MM X 15MM
Type: IMPLANTABLE DEVICE | Site: HIP | Status: FUNCTIONAL
Brand: PINNACLE

## 2017-03-06 DEVICE — TOTL HIP MOA DEPUY 9641333: Type: IMPLANTABLE DEVICE | Site: HIP | Status: FUNCTIONAL

## 2017-03-06 DEVICE — PINNACLE CANCELLOUS BONE SCREW 6.5MM X 20MM
Type: IMPLANTABLE DEVICE | Site: HIP | Status: FUNCTIONAL
Brand: PINNACLE

## 2017-03-06 RX ORDER — ACETAMINOPHEN 325 MG/1
650 TABLET ORAL ONCE
Status: COMPLETED | OUTPATIENT
Start: 2017-03-06 | End: 2017-03-06

## 2017-03-06 RX ORDER — ROPIVACAINE HYDROCHLORIDE 5 MG/ML
INJECTION, SOLUTION EPIDURAL; INFILTRATION; PERINEURAL AS NEEDED
Status: DISCONTINUED | OUTPATIENT
Start: 2017-03-06 | End: 2017-03-06 | Stop reason: HOSPADM

## 2017-03-06 RX ORDER — MAGNESIUM HYDROXIDE 1200 MG/15ML
LIQUID ORAL AS NEEDED
Status: DISCONTINUED | OUTPATIENT
Start: 2017-03-06 | End: 2017-03-06 | Stop reason: HOSPADM

## 2017-03-06 RX ORDER — HYDROCODONE BITARTRATE AND ACETAMINOPHEN 5; 325 MG/1; MG/1
1 TABLET ORAL EVERY 4 HOURS PRN
Status: DISCONTINUED | OUTPATIENT
Start: 2017-03-06 | End: 2017-03-07

## 2017-03-06 RX ORDER — ZOLPIDEM TARTRATE 5 MG/1
10 TABLET ORAL NIGHTLY PRN
Status: DISCONTINUED | OUTPATIENT
Start: 2017-03-06 | End: 2017-03-07 | Stop reason: HOSPADM

## 2017-03-06 RX ORDER — ALBUMIN, HUMAN INJ 5% 5 %
SOLUTION INTRAVENOUS CONTINUOUS PRN
Status: DISCONTINUED | OUTPATIENT
Start: 2017-03-06 | End: 2017-03-06 | Stop reason: SURG

## 2017-03-06 RX ORDER — BISACODYL 10 MG
10 SUPPOSITORY, RECTAL RECTAL DAILY PRN
Status: DISCONTINUED | OUTPATIENT
Start: 2017-03-06 | End: 2017-03-07 | Stop reason: HOSPADM

## 2017-03-06 RX ORDER — FENTANYL CITRATE 50 UG/ML
50 INJECTION, SOLUTION INTRAMUSCULAR; INTRAVENOUS
Status: DISCONTINUED | OUTPATIENT
Start: 2017-03-06 | End: 2017-03-07

## 2017-03-06 RX ORDER — DEXTROSE MONOHYDRATE 25 G/50ML
25 INJECTION, SOLUTION INTRAVENOUS
Status: DISCONTINUED | OUTPATIENT
Start: 2017-03-06 | End: 2017-03-07 | Stop reason: HOSPADM

## 2017-03-06 RX ORDER — HYDROCODONE BITARTRATE AND ACETAMINOPHEN 10; 325 MG/1; MG/1
1 TABLET ORAL EVERY 4 HOURS PRN
Status: DISCONTINUED | OUTPATIENT
Start: 2017-03-06 | End: 2017-03-07

## 2017-03-06 RX ORDER — SODIUM CHLORIDE 0.9 % (FLUSH) 0.9 %
1-10 SYRINGE (ML) INJECTION AS NEEDED
Status: DISCONTINUED | OUTPATIENT
Start: 2017-03-06 | End: 2017-03-07 | Stop reason: HOSPADM

## 2017-03-06 RX ORDER — SODIUM CHLORIDE 0.9 % (FLUSH) 0.9 %
1-10 SYRINGE (ML) INJECTION AS NEEDED
Status: DISCONTINUED | OUTPATIENT
Start: 2017-03-06 | End: 2017-03-06 | Stop reason: HOSPADM

## 2017-03-06 RX ORDER — SPIRONOLACTONE 25 MG/1
25 TABLET ORAL DAILY
Status: DISCONTINUED | OUTPATIENT
Start: 2017-03-06 | End: 2017-03-07 | Stop reason: HOSPADM

## 2017-03-06 RX ORDER — PROPOFOL 10 MG/ML
VIAL (ML) INTRAVENOUS AS NEEDED
Status: DISCONTINUED | OUTPATIENT
Start: 2017-03-06 | End: 2017-03-06 | Stop reason: SURG

## 2017-03-06 RX ORDER — CELECOXIB 200 MG/1
200 CAPSULE ORAL ONCE
Status: COMPLETED | OUTPATIENT
Start: 2017-03-06 | End: 2017-03-06

## 2017-03-06 RX ORDER — LIDOCAINE HYDROCHLORIDE 10 MG/ML
INJECTION, SOLUTION EPIDURAL; INFILTRATION; INTRACAUDAL; PERINEURAL AS NEEDED
Status: DISCONTINUED | OUTPATIENT
Start: 2017-03-06 | End: 2017-03-06 | Stop reason: SURG

## 2017-03-06 RX ORDER — CEFAZOLIN SODIUM 2 G/100ML
2 INJECTION, SOLUTION INTRAVENOUS ONCE
Status: COMPLETED | OUTPATIENT
Start: 2017-03-06 | End: 2017-03-06

## 2017-03-06 RX ORDER — THIAMINE MONONITRATE (VIT B1) 100 MG
100 TABLET ORAL DAILY
Status: DISCONTINUED | OUTPATIENT
Start: 2017-03-06 | End: 2017-03-07 | Stop reason: HOSPADM

## 2017-03-06 RX ORDER — KETOROLAC TROMETHAMINE 15 MG/ML
15 INJECTION, SOLUTION INTRAMUSCULAR; INTRAVENOUS EVERY 6 HOURS PRN
Status: DISCONTINUED | OUTPATIENT
Start: 2017-03-06 | End: 2017-03-07 | Stop reason: HOSPADM

## 2017-03-06 RX ORDER — SODIUM CHLORIDE 9 MG/ML
INJECTION, SOLUTION INTRAVENOUS CONTINUOUS PRN
Status: DISCONTINUED | OUTPATIENT
Start: 2017-03-06 | End: 2017-03-06 | Stop reason: SURG

## 2017-03-06 RX ORDER — PANTOPRAZOLE SODIUM 40 MG/1
40 TABLET, DELAYED RELEASE ORAL
Status: DISCONTINUED | OUTPATIENT
Start: 2017-03-07 | End: 2017-03-07 | Stop reason: HOSPADM

## 2017-03-06 RX ORDER — NICOTINE 21 MG/24HR
1 PATCH, TRANSDERMAL 24 HOURS TRANSDERMAL DAILY PRN
Status: DISCONTINUED | OUTPATIENT
Start: 2017-03-06 | End: 2017-03-07 | Stop reason: HOSPADM

## 2017-03-06 RX ORDER — PROPOFOL 10 MG/ML
VIAL (ML) INTRAVENOUS CONTINUOUS PRN
Status: DISCONTINUED | OUTPATIENT
Start: 2017-03-06 | End: 2017-03-06 | Stop reason: SURG

## 2017-03-06 RX ORDER — HYDRALAZINE HYDROCHLORIDE 20 MG/ML
10 INJECTION INTRAMUSCULAR; INTRAVENOUS EVERY 6 HOURS PRN
Status: DISCONTINUED | OUTPATIENT
Start: 2017-03-06 | End: 2017-03-07 | Stop reason: HOSPADM

## 2017-03-06 RX ORDER — FENTANYL CITRATE 50 UG/ML
50 INJECTION, SOLUTION INTRAMUSCULAR; INTRAVENOUS
Status: DISCONTINUED | OUTPATIENT
Start: 2017-03-06 | End: 2017-03-06 | Stop reason: HOSPADM

## 2017-03-06 RX ORDER — FENTANYL CITRATE 50 UG/ML
INJECTION, SOLUTION INTRAMUSCULAR; INTRAVENOUS AS NEEDED
Status: DISCONTINUED | OUTPATIENT
Start: 2017-03-06 | End: 2017-03-06 | Stop reason: SURG

## 2017-03-06 RX ORDER — CEFAZOLIN SODIUM 2 G/100ML
2 INJECTION, SOLUTION INTRAVENOUS EVERY 8 HOURS
Status: COMPLETED | OUTPATIENT
Start: 2017-03-06 | End: 2017-03-07

## 2017-03-06 RX ORDER — ONDANSETRON 2 MG/ML
4 INJECTION INTRAMUSCULAR; INTRAVENOUS EVERY 6 HOURS PRN
Status: DISCONTINUED | OUTPATIENT
Start: 2017-03-06 | End: 2017-03-07 | Stop reason: HOSPADM

## 2017-03-06 RX ORDER — HYDROMORPHONE HYDROCHLORIDE 1 MG/ML
0.5 INJECTION, SOLUTION INTRAMUSCULAR; INTRAVENOUS; SUBCUTANEOUS
Status: DISCONTINUED | OUTPATIENT
Start: 2017-03-06 | End: 2017-03-07

## 2017-03-06 RX ORDER — FOLIC ACID 1 MG/1
1 TABLET ORAL DAILY
Status: DISCONTINUED | OUTPATIENT
Start: 2017-03-06 | End: 2017-03-07 | Stop reason: HOSPADM

## 2017-03-06 RX ORDER — FAMOTIDINE 20 MG/1
20 TABLET, FILM COATED ORAL ONCE
Status: COMPLETED | OUTPATIENT
Start: 2017-03-06 | End: 2017-03-06

## 2017-03-06 RX ORDER — SODIUM CHLORIDE, SODIUM LACTATE, POTASSIUM CHLORIDE, CALCIUM CHLORIDE 600; 310; 30; 20 MG/100ML; MG/100ML; MG/100ML; MG/100ML
9 INJECTION, SOLUTION INTRAVENOUS CONTINUOUS
Status: DISCONTINUED | OUTPATIENT
Start: 2017-03-06 | End: 2017-03-06 | Stop reason: SDUPTHER

## 2017-03-06 RX ORDER — DEXTROSE MONOHYDRATE 25 G/50ML
25 INJECTION, SOLUTION INTRAVENOUS
Status: DISCONTINUED | OUTPATIENT
Start: 2017-03-06 | End: 2017-03-06 | Stop reason: HOSPADM

## 2017-03-06 RX ORDER — NICOTINE POLACRILEX 4 MG
15 LOZENGE BUCCAL
Status: DISCONTINUED | OUTPATIENT
Start: 2017-03-06 | End: 2017-03-06 | Stop reason: HOSPADM

## 2017-03-06 RX ORDER — IPRATROPIUM BROMIDE AND ALBUTEROL SULFATE 2.5; .5 MG/3ML; MG/3ML
3 SOLUTION RESPIRATORY (INHALATION)
Status: DISCONTINUED | OUTPATIENT
Start: 2017-03-06 | End: 2017-03-07 | Stop reason: HOSPADM

## 2017-03-06 RX ORDER — MEPERIDINE HYDROCHLORIDE 25 MG/ML
12.5 INJECTION INTRAMUSCULAR; INTRAVENOUS; SUBCUTANEOUS
Status: DISCONTINUED | OUTPATIENT
Start: 2017-03-06 | End: 2017-03-06 | Stop reason: HOSPADM

## 2017-03-06 RX ORDER — GABAPENTIN 300 MG/1
300 CAPSULE ORAL 3 TIMES DAILY
Status: DISCONTINUED | OUTPATIENT
Start: 2017-03-06 | End: 2017-03-07 | Stop reason: HOSPADM

## 2017-03-06 RX ORDER — LORAZEPAM 2 MG/ML
1 INJECTION INTRAMUSCULAR EVERY 4 HOURS PRN
Status: DISCONTINUED | OUTPATIENT
Start: 2017-03-06 | End: 2017-03-07 | Stop reason: HOSPADM

## 2017-03-06 RX ORDER — NICOTINE POLACRILEX 4 MG
15 LOZENGE BUCCAL
Status: DISCONTINUED | OUTPATIENT
Start: 2017-03-06 | End: 2017-03-07 | Stop reason: HOSPADM

## 2017-03-06 RX ORDER — CHLORDIAZEPOXIDE HYDROCHLORIDE 25 MG/1
25 CAPSULE, GELATIN COATED ORAL EVERY 6 HOURS SCHEDULED
Status: DISCONTINUED | OUTPATIENT
Start: 2017-03-06 | End: 2017-03-07 | Stop reason: HOSPADM

## 2017-03-06 RX ORDER — MIDAZOLAM HYDROCHLORIDE 1 MG/ML
INJECTION INTRAMUSCULAR; INTRAVENOUS AS NEEDED
Status: DISCONTINUED | OUTPATIENT
Start: 2017-03-06 | End: 2017-03-06 | Stop reason: SURG

## 2017-03-06 RX ORDER — ACETAMINOPHEN 325 MG/1
650 TABLET ORAL EVERY 4 HOURS PRN
Status: DISCONTINUED | OUTPATIENT
Start: 2017-03-06 | End: 2017-03-07 | Stop reason: HOSPADM

## 2017-03-06 RX ORDER — FUROSEMIDE 20 MG/1
20 TABLET ORAL DAILY
Status: DISCONTINUED | OUTPATIENT
Start: 2017-03-06 | End: 2017-03-07 | Stop reason: HOSPADM

## 2017-03-06 RX ORDER — LIDOCAINE HYDROCHLORIDE 10 MG/ML
1 INJECTION, SOLUTION EPIDURAL; INFILTRATION; INTRACAUDAL; PERINEURAL ONCE
Status: COMPLETED | OUTPATIENT
Start: 2017-03-06 | End: 2017-03-06

## 2017-03-06 RX ORDER — ACETAMINOPHEN 650 MG
TABLET, EXTENDED RELEASE ORAL AS NEEDED
Status: DISCONTINUED | OUTPATIENT
Start: 2017-03-06 | End: 2017-03-06 | Stop reason: HOSPADM

## 2017-03-06 RX ORDER — MIDAZOLAM HYDROCHLORIDE 1 MG/ML
1 INJECTION INTRAMUSCULAR; INTRAVENOUS
Status: DISCONTINUED | OUTPATIENT
Start: 2017-03-06 | End: 2017-03-06 | Stop reason: HOSPADM

## 2017-03-06 RX ORDER — LACTULOSE 10 G/15ML
20 SOLUTION ORAL 2 TIMES DAILY PRN
Status: DISCONTINUED | OUTPATIENT
Start: 2017-03-06 | End: 2017-03-07 | Stop reason: HOSPADM

## 2017-03-06 RX ORDER — ATRACURIUM BESYLATE 10 MG/ML
INJECTION, SOLUTION INTRAVENOUS AS NEEDED
Status: DISCONTINUED | OUTPATIENT
Start: 2017-03-06 | End: 2017-03-06 | Stop reason: SURG

## 2017-03-06 RX ORDER — DOCUSATE SODIUM 100 MG/1
100 CAPSULE, LIQUID FILLED ORAL 2 TIMES DAILY
Status: DISCONTINUED | OUTPATIENT
Start: 2017-03-06 | End: 2017-03-07 | Stop reason: HOSPADM

## 2017-03-06 RX ORDER — HYDROMORPHONE HYDROCHLORIDE 1 MG/ML
0.5 INJECTION, SOLUTION INTRAMUSCULAR; INTRAVENOUS; SUBCUTANEOUS
Status: COMPLETED | OUTPATIENT
Start: 2017-03-06 | End: 2017-03-06

## 2017-03-06 RX ORDER — PREGABALIN 75 MG/1
75 CAPSULE ORAL ONCE
Status: COMPLETED | OUTPATIENT
Start: 2017-03-06 | End: 2017-03-06

## 2017-03-06 RX ORDER — BUPIVACAINE HYDROCHLORIDE AND EPINEPHRINE 2.5; 5 MG/ML; UG/ML
INJECTION, SOLUTION EPIDURAL; INFILTRATION; INTRACAUDAL; PERINEURAL AS NEEDED
Status: DISCONTINUED | OUTPATIENT
Start: 2017-03-06 | End: 2017-03-06 | Stop reason: HOSPADM

## 2017-03-06 RX ORDER — SODIUM CHLORIDE 9 MG/ML
150 INJECTION, SOLUTION INTRAVENOUS CONTINUOUS
Status: DISCONTINUED | OUTPATIENT
Start: 2017-03-06 | End: 2017-03-07 | Stop reason: HOSPADM

## 2017-03-06 RX ORDER — LABETALOL HYDROCHLORIDE 5 MG/ML
INJECTION, SOLUTION INTRAVENOUS AS NEEDED
Status: DISCONTINUED | OUTPATIENT
Start: 2017-03-06 | End: 2017-03-06 | Stop reason: SURG

## 2017-03-06 RX ADMIN — SODIUM CHLORIDE: 9 INJECTION, SOLUTION INTRAVENOUS at 15:45

## 2017-03-06 RX ADMIN — FENTANYL CITRATE 100 MCG: 50 INJECTION, SOLUTION INTRAMUSCULAR; INTRAVENOUS at 14:35

## 2017-03-06 RX ADMIN — PHENYLEPHRINE HYDROCHLORIDE 100 MCG: 10 INJECTION INTRAVENOUS at 15:54

## 2017-03-06 RX ADMIN — FENTANYL CITRATE 50 MCG: 50 INJECTION, SOLUTION INTRAMUSCULAR; INTRAVENOUS at 18:13

## 2017-03-06 RX ADMIN — INSULIN LISPRO 3 UNITS: 100 INJECTION, SOLUTION INTRAVENOUS; SUBCUTANEOUS at 22:19

## 2017-03-06 RX ADMIN — SODIUM CHLORIDE, POTASSIUM CHLORIDE, SODIUM LACTATE AND CALCIUM CHLORIDE 9 ML/HR: 600; 310; 30; 20 INJECTION, SOLUTION INTRAVENOUS at 13:28

## 2017-03-06 RX ADMIN — LIDOCAINE HYDROCHLORIDE 50 MG: 10 INJECTION, SOLUTION EPIDURAL; INFILTRATION; INTRACAUDAL; PERINEURAL at 14:22

## 2017-03-06 RX ADMIN — LABETALOL HYDROCHLORIDE 5 MG: 5 INJECTION, SOLUTION INTRAVENOUS at 15:40

## 2017-03-06 RX ADMIN — SODIUM CHLORIDE, POTASSIUM CHLORIDE, SODIUM LACTATE AND CALCIUM CHLORIDE: 600; 310; 30; 20 INJECTION, SOLUTION INTRAVENOUS at 17:03

## 2017-03-06 RX ADMIN — FENTANYL CITRATE 100 MCG: 50 INJECTION, SOLUTION INTRAMUSCULAR; INTRAVENOUS at 14:22

## 2017-03-06 RX ADMIN — SODIUM CHLORIDE 150 ML/HR: 9 INJECTION, SOLUTION INTRAVENOUS at 22:17

## 2017-03-06 RX ADMIN — HYDROMORPHONE HYDROCHLORIDE 0.5 MG: 1 INJECTION, SOLUTION INTRAMUSCULAR; INTRAVENOUS; SUBCUTANEOUS at 17:43

## 2017-03-06 RX ADMIN — PHENYLEPHRINE HYDROCHLORIDE 200 MCG: 10 INJECTION INTRAVENOUS at 15:57

## 2017-03-06 RX ADMIN — MIDAZOLAM 1 MG: 1 INJECTION INTRAMUSCULAR; INTRAVENOUS at 15:20

## 2017-03-06 RX ADMIN — KETOROLAC TROMETHAMINE 15 MG: 15 INJECTION, SOLUTION INTRAMUSCULAR; INTRAVENOUS at 22:04

## 2017-03-06 RX ADMIN — ACETAMINOPHEN 650 MG: 325 TABLET, FILM COATED ORAL at 13:45

## 2017-03-06 RX ADMIN — HYDROCODONE BITARTRATE AND ACETAMINOPHEN 1 TABLET: 10; 325 TABLET ORAL at 18:57

## 2017-03-06 RX ADMIN — LIDOCAINE HYDROCHLORIDE 0.2 ML: 10 INJECTION, SOLUTION EPIDURAL; INFILTRATION; INTRACAUDAL; PERINEURAL at 13:28

## 2017-03-06 RX ADMIN — TRANEXAMIC ACID 800 MG: 100 INJECTION, SOLUTION INTRAVENOUS at 14:35

## 2017-03-06 RX ADMIN — CEFAZOLIN SODIUM 2 G: 2 INJECTION, SOLUTION INTRAVENOUS at 14:20

## 2017-03-06 RX ADMIN — PROPOFOL 25 MCG/KG/MIN: 10 INJECTION, EMULSION INTRAVENOUS at 14:26

## 2017-03-06 RX ADMIN — HYDROMORPHONE HYDROCHLORIDE 0.5 MG: 1 INJECTION, SOLUTION INTRAMUSCULAR; INTRAVENOUS; SUBCUTANEOUS at 17:58

## 2017-03-06 RX ADMIN — CELECOXIB 200 MG: 200 CAPSULE ORAL at 13:52

## 2017-03-06 RX ADMIN — SPIRONOLACTONE 25 MG: 25 TABLET, FILM COATED ORAL at 22:05

## 2017-03-06 RX ADMIN — LABETALOL HYDROCHLORIDE 10 MG: 5 INJECTION, SOLUTION INTRAVENOUS at 15:44

## 2017-03-06 RX ADMIN — PROPOFOL 300 MG: 10 INJECTION, EMULSION INTRAVENOUS at 14:22

## 2017-03-06 RX ADMIN — HYDROMORPHONE HYDROCHLORIDE 1 MG: 1 INJECTION, SOLUTION INTRAMUSCULAR; INTRAVENOUS; SUBCUTANEOUS at 22:04

## 2017-03-06 RX ADMIN — LABETALOL HYDROCHLORIDE 5 MG: 5 INJECTION, SOLUTION INTRAVENOUS at 15:35

## 2017-03-06 RX ADMIN — MIDAZOLAM 1 MG: 1 INJECTION INTRAMUSCULAR; INTRAVENOUS at 14:35

## 2017-03-06 RX ADMIN — MUPIROCIN: 20 OINTMENT TOPICAL at 13:45

## 2017-03-06 RX ADMIN — CEFAZOLIN SODIUM 2 G: 2 INJECTION, SOLUTION INTRAVENOUS at 22:06

## 2017-03-06 RX ADMIN — ATRACURIUM BESYLATE 50 MG: 10 INJECTION, SOLUTION INTRAVENOUS at 14:22

## 2017-03-06 RX ADMIN — ALBUMIN HUMAN: 0.05 INJECTION, SOLUTION INTRAVENOUS at 15:50

## 2017-03-06 RX ADMIN — PHENYLEPHRINE HYDROCHLORIDE 200 MCG: 10 INJECTION INTRAVENOUS at 17:00

## 2017-03-06 RX ADMIN — GABAPENTIN 300 MG: 300 CAPSULE ORAL at 22:04

## 2017-03-06 RX ADMIN — FENTANYL CITRATE 50 MCG: 50 INJECTION, SOLUTION INTRAMUSCULAR; INTRAVENOUS at 16:45

## 2017-03-06 RX ADMIN — HYDROMORPHONE HYDROCHLORIDE 0.5 MG: 1 INJECTION, SOLUTION INTRAMUSCULAR; INTRAVENOUS; SUBCUTANEOUS at 17:48

## 2017-03-06 RX ADMIN — FOLIC ACID 1 MG: 1 TABLET ORAL at 22:05

## 2017-03-06 RX ADMIN — MIDAZOLAM 2 MG: 1 INJECTION INTRAMUSCULAR; INTRAVENOUS at 14:20

## 2017-03-06 RX ADMIN — PREGABALIN 75 MG: 75 CAPSULE ORAL at 13:45

## 2017-03-06 RX ADMIN — HYDROMORPHONE HYDROCHLORIDE 0.5 MG: 1 INJECTION, SOLUTION INTRAMUSCULAR; INTRAVENOUS; SUBCUTANEOUS at 18:05

## 2017-03-06 RX ADMIN — TRANEXAMIC ACID 800 MG: 100 INJECTION, SOLUTION INTRAVENOUS at 17:00

## 2017-03-06 RX ADMIN — FAMOTIDINE 20 MG: 20 TABLET ORAL at 13:45

## 2017-03-06 RX ADMIN — PHENYLEPHRINE HYDROCHLORIDE 100 MCG: 10 INJECTION INTRAVENOUS at 15:50

## 2017-03-06 RX ADMIN — Medication 100 MG: at 22:05

## 2017-03-06 RX ADMIN — CHLORDIAZEPOXIDE HYDROCHLORIDE 25 MG: 25 CAPSULE ORAL at 18:11

## 2017-03-06 NOTE — ANESTHESIA POSTPROCEDURE EVALUATION
Patient: Kenan Do    Procedure Summary     Date Anesthesia Start Anesthesia Stop Room / Location    03/06/17 1419 1731 [facility] / [facility]       Procedure Diagnosis Surgeon Provider    RIGHT TOTAL HIP ARTHROPLASTY ANTERIOR (Right Hip) No diagnosis on file. MD Stephen Finney MD          Anesthesia Type: general  Last vitals  BP      Temp      Pulse     Resp      SpO2        Post Anesthesia Care and Evaluation    Patient location during evaluation: PACU  Patient participation: complete - patient participated  Level of consciousness: awake and alert  Pain management: adequate  Airway patency: patent  Anesthetic complications: No anesthetic complications  PONV Status: none  Cardiovascular status: hemodynamically stable and acceptable  Respiratory status: nonlabored ventilation, acceptable and nasal cannula  Hydration status: acceptable

## 2017-03-06 NOTE — BRIEF OP NOTE
TOTAL HIP ARTHROPLASTY ANTERIOR  Procedure Note    Kenan Do  3/6/2017    Pre-op Diagnosis:   Right hip OA with acetabular margin loss    Post-op Diagnosis:     Right hip OA with acetabular margin loss    Procedure/CPT® Codes:  12243    Procedure(s):  RIGHT TOTAL HIP ARTHROPLASTY ANTERIOR    Surgeon(s):  Yoel Brewster MD    Anesthesia: General    Staff:   Circulator: Irma Huddleston RN; Amanda Granado RN  Radiology Technologist: Rossana Toledo, RT; RT Zahida  Scrub Person: Volodymyr Ovalle; Girish Rodrigues  Nursing Assistant: Won Vargas  Assistant: Demi Henderson CSA    Estimated Blood Loss: 1700 mL  Urine Voided: * No values recorded between 3/6/2017  2:19 PM and 3/6/2017  5:26 PM *    Specimens:                * No specimens in log *      Drains:           Findings: Diffuse bleeding without identifiable vessel    Complications: None      Yoel Brewster MD     Date: 3/6/2017  Time: 5:27 PM

## 2017-03-06 NOTE — ANESTHESIA PROCEDURE NOTES
Peripheral IV    Patient location during procedure: pre-op  Line placed for Difficult Access.  Performed By   CRNA: LIYAH PARKER  Preanesthetic Checklist  Completed: patient identified, site marked, surgical consent, pre-op evaluation, timeout performed, IV checked, risks and benefits discussed and monitors and equipment checked  Peripheral IV Prep   Patient position: supine   Prep: ChloraPrep  Peripheral IV Procedure   Laterality:left  Location:  Hand  Catheter size: 18 G (16)         Post Assessment   Dressing Type: tape and transparent.    IV Dressing/Site: clean, dry and intact

## 2017-03-06 NOTE — ANESTHESIA PROCEDURE NOTES
Peripheral IV    Patient location during procedure: pre-op  Line placed for Difficult Access.  Performed By   CRNA: LIYAH PARKER  Preanesthetic Checklist  Completed: patient identified, site marked, surgical consent, pre-op evaluation, timeout performed, IV checked, risks and benefits discussed and monitors and equipment checked  Peripheral IV Prep   Patient position: supine   Prep: ChloraPrep  Peripheral IV Procedure   Laterality:left  Location:  Arm  Catheter size: 18 G (16)         Post Assessment   Dressing Type: tape and transparent.    IV Dressing/Site: clean, dry and intact  Additional Notes  Hand 16g infiltrated, small edemtaous area, stopped ivf, new  Started, old d/c

## 2017-03-06 NOTE — ANESTHESIA PROCEDURE NOTES
Airway  Urgency: elective    Date/Time: 3/6/2017 2:21 PM  End Time:3/6/2017 2:25 PM  Airway not difficult    General Information and Staff    Patient location during procedure: OR  Anesthesiologist: CARLEEN MANSFIELD  CRNA: VÍCTOR JOSHUA    Indications and Patient Condition  Indications for airway management: airway protection    Preoxygenated: yes  MILS not maintained throughout  Mask difficulty assessment: 1 - vent by mask    Final Airway Details  Final airway type: endotracheal airway      Successful airway: ETT  Cuffed: yes   Successful intubation technique: direct laryngoscopy  Endotracheal tube insertion site: oral  Blade: Sammy  Blade size: #3  ETT size: 7.5 mm  Cormack-Lehane Classification: grade I - full view of glottis  Placement verified by: chest auscultation and capnometry   Inital cuff pressure (cm H2O): 15  Cuff volume (mL): 7  Measured from: lips  ETT to lips (cm): 22  Number of attempts at approach: 1    Additional Comments  Negative epigastric sounds, Breath sound equal bilaterally with symmetric chest rise and fall

## 2017-03-06 NOTE — ANESTHESIA PREPROCEDURE EVALUATION
Anesthesia Evaluation     Patient summary reviewed and Nursing notes reviewed   no history of anesthetic complications:  NPO Status: > 8 hours   Airway   Mallampati: II  TM distance: >3 FB  Neck ROM: full  no difficulty expected  Dental    (+) poor dentation    Pulmonary - normal exam   (+) a smoker,   Cardiovascular - negative cardio ROS and normal exam        Neuro/Psych- negative ROS  GI/Hepatic/Renal/Endo    (+)  GERD, hepatitis, liver disease, diabetes mellitus,     Musculoskeletal     Abdominal    Substance History   (+) alcohol use (reports 6 pack daily),      OB/GYN          Other                                  Anesthesia Plan    ASA 4     general   (Versed 5-10 mg.  Close obsewrvation for post op confusion/dt)  intravenous induction   Anesthetic plan and risks discussed with patient.    Plan discussed with CRNA.
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
## 2017-03-07 VITALS
TEMPERATURE: 100.1 F | OXYGEN SATURATION: 95 % | DIASTOLIC BLOOD PRESSURE: 52 MMHG | SYSTOLIC BLOOD PRESSURE: 107 MMHG | HEIGHT: 72 IN | HEART RATE: 81 BPM | RESPIRATION RATE: 18 BRPM | BODY MASS INDEX: 23.84 KG/M2 | WEIGHT: 176 LBS

## 2017-03-07 PROBLEM — D69.6 THROMBOCYTOPENIA (HCC): Status: ACTIVE | Noted: 2017-03-07

## 2017-03-07 LAB
ABO + RH BLD: NORMAL
ANION GAP SERPL CALCULATED.3IONS-SCNC: 4 MMOL/L (ref 3–11)
BASOPHILS # BLD AUTO: 0.01 10*3/MM3 (ref 0–0.2)
BASOPHILS NFR BLD AUTO: 0.3 % (ref 0–1)
BH BB BLOOD EXPIRATION DATE: NORMAL
BH BB BLOOD TYPE BARCODE: 5100
BH BB BLOOD TYPE BARCODE: 5100
BH BB BLOOD TYPE BARCODE: 7300
BH BB BLOOD TYPE BARCODE: 8400
BH BB DISPENSE STATUS: NORMAL
BH BB PRODUCT CODE: NORMAL
BH BB UNIT NUMBER: NORMAL
BUN BLD-MCNC: 10 MG/DL (ref 9–23)
BUN/CREAT SERPL: 20 (ref 7–25)
CALCIUM SPEC-SCNC: 8.8 MG/DL (ref 8.7–10.4)
CHLORIDE SERPL-SCNC: 101 MMOL/L (ref 99–109)
CO2 SERPL-SCNC: 29 MMOL/L (ref 20–31)
CREAT BLD-MCNC: 0.5 MG/DL (ref 0.6–1.3)
DEPRECATED RDW RBC AUTO: 43.7 FL (ref 37–54)
EOSINOPHIL # BLD AUTO: 0.04 10*3/MM3 (ref 0.1–0.3)
EOSINOPHIL NFR BLD AUTO: 1.2 % (ref 0–3)
ERYTHROCYTE [DISTWIDTH] IN BLOOD BY AUTOMATED COUNT: 12.3 % (ref 11.3–14.5)
GFR SERPL CREATININE-BSD FRML MDRD: >150 ML/MIN/1.73
GLUCOSE BLD-MCNC: 139 MG/DL (ref 70–100)
GLUCOSE BLDC GLUCOMTR-MCNC: 140 MG/DL (ref 70–130)
GLUCOSE BLDC GLUCOMTR-MCNC: 260 MG/DL (ref 70–130)
HCT VFR BLD AUTO: 25.4 % (ref 38.9–50.9)
HGB BLD-MCNC: 9 G/DL (ref 13.1–17.5)
IMM GRANULOCYTES # BLD: 0.01 10*3/MM3 (ref 0–0.03)
IMM GRANULOCYTES NFR BLD: 0.3 % (ref 0–0.6)
LYMPHOCYTES # BLD AUTO: 0.66 10*3/MM3 (ref 0.6–4.8)
LYMPHOCYTES NFR BLD AUTO: 19.2 % (ref 24–44)
MCH RBC QN AUTO: 34.4 PG (ref 27–31)
MCHC RBC AUTO-ENTMCNC: 35.4 G/DL (ref 32–36)
MCV RBC AUTO: 96.9 FL (ref 80–99)
MONOCYTES # BLD AUTO: 0.5 10*3/MM3 (ref 0–1)
MONOCYTES NFR BLD AUTO: 14.6 % (ref 0–12)
NEUTROPHILS # BLD AUTO: 2.21 10*3/MM3 (ref 1.5–8.3)
NEUTROPHILS NFR BLD AUTO: 64.4 % (ref 41–71)
PLATELET # BLD AUTO: 56 10*3/MM3 (ref 150–450)
PMV BLD AUTO: 10.1 FL (ref 6–12)
POTASSIUM BLD-SCNC: 3.6 MMOL/L (ref 3.5–5.5)
RBC # BLD AUTO: 2.62 10*6/MM3 (ref 4.2–5.76)
SODIUM BLD-SCNC: 134 MMOL/L (ref 132–146)
UNIT  ABO: NORMAL
UNIT  RH: NORMAL
WBC NRBC COR # BLD: 3.43 10*3/MM3 (ref 3.5–10.8)

## 2017-03-07 PROCEDURE — 94640 AIRWAY INHALATION TREATMENT: CPT

## 2017-03-07 PROCEDURE — 97110 THERAPEUTIC EXERCISES: CPT

## 2017-03-07 PROCEDURE — 25010000003 CEFAZOLIN IN DEXTROSE 2-4 GM/100ML-% SOLUTION: Performed by: ORTHOPAEDIC SURGERY

## 2017-03-07 PROCEDURE — 80048 BASIC METABOLIC PNL TOTAL CA: CPT | Performed by: NURSE PRACTITIONER

## 2017-03-07 PROCEDURE — 97162 PT EVAL MOD COMPLEX 30 MIN: CPT

## 2017-03-07 PROCEDURE — 82962 GLUCOSE BLOOD TEST: CPT

## 2017-03-07 PROCEDURE — 94799 UNLISTED PULMONARY SVC/PX: CPT

## 2017-03-07 PROCEDURE — 97530 THERAPEUTIC ACTIVITIES: CPT

## 2017-03-07 PROCEDURE — G0108 DIAB MANAGE TRN  PER INDIV: HCPCS | Performed by: REGISTERED NURSE

## 2017-03-07 PROCEDURE — 85025 COMPLETE CBC W/AUTO DIFF WBC: CPT | Performed by: ORTHOPAEDIC SURGERY

## 2017-03-07 PROCEDURE — 97116 GAIT TRAINING THERAPY: CPT

## 2017-03-07 PROCEDURE — 97166 OT EVAL MOD COMPLEX 45 MIN: CPT

## 2017-03-07 RX ORDER — OXYCODONE HYDROCHLORIDE 5 MG/1
10 TABLET ORAL EVERY 4 HOURS PRN
Status: DISCONTINUED | OUTPATIENT
Start: 2017-03-07 | End: 2017-03-07 | Stop reason: HOSPADM

## 2017-03-07 RX ORDER — ASPIRIN 325 MG
325 TABLET, DELAYED RELEASE (ENTERIC COATED) ORAL DAILY
Qty: 30 TABLET | Refills: 0 | Status: SHIPPED | OUTPATIENT
Start: 2017-03-07

## 2017-03-07 RX ORDER — OXYCODONE HYDROCHLORIDE 10 MG/1
10 TABLET ORAL EVERY 4 HOURS PRN
Qty: 40 TABLET | Refills: 0 | Status: SHIPPED | OUTPATIENT
Start: 2017-03-07 | End: 2017-03-17

## 2017-03-07 RX ORDER — PSEUDOEPHEDRINE HCL 30 MG
100 TABLET ORAL 2 TIMES DAILY
Qty: 60 EACH | Refills: 0 | Status: SHIPPED | OUTPATIENT
Start: 2017-03-07

## 2017-03-07 RX ADMIN — CEFAZOLIN SODIUM 2 G: 2 INJECTION, SOLUTION INTRAVENOUS at 05:44

## 2017-03-07 RX ADMIN — Medication 100 MG: at 08:33

## 2017-03-07 RX ADMIN — FUROSEMIDE 20 MG: 20 TABLET ORAL at 08:33

## 2017-03-07 RX ADMIN — PANTOPRAZOLE SODIUM 40 MG: 40 TABLET, DELAYED RELEASE ORAL at 05:44

## 2017-03-07 RX ADMIN — FOLIC ACID 1 MG: 1 TABLET ORAL at 08:33

## 2017-03-07 RX ADMIN — GABAPENTIN 300 MG: 300 CAPSULE ORAL at 08:33

## 2017-03-07 RX ADMIN — OXYCODONE HYDROCHLORIDE 10 MG: 5 TABLET ORAL at 15:17

## 2017-03-07 RX ADMIN — HYDROCODONE BITARTRATE AND ACETAMINOPHEN 1 TABLET: 10; 325 TABLET ORAL at 08:43

## 2017-03-07 RX ADMIN — INSULIN LISPRO 4 UNITS: 100 INJECTION, SOLUTION INTRAVENOUS; SUBCUTANEOUS at 13:38

## 2017-03-07 RX ADMIN — HYDROCODONE BITARTRATE AND ACETAMINOPHEN 1 TABLET: 5; 325 TABLET ORAL at 05:44

## 2017-03-07 RX ADMIN — CHLORDIAZEPOXIDE HYDROCHLORIDE 25 MG: 25 CAPSULE ORAL at 05:44

## 2017-03-07 RX ADMIN — HYDROCODONE BITARTRATE AND ACETAMINOPHEN 1 TABLET: 5; 325 TABLET ORAL at 10:18

## 2017-03-07 RX ADMIN — CHLORDIAZEPOXIDE HYDROCHLORIDE 25 MG: 25 CAPSULE ORAL at 12:25

## 2017-03-07 RX ADMIN — SPIRONOLACTONE 25 MG: 25 TABLET, FILM COATED ORAL at 08:33

## 2017-03-07 RX ADMIN — DOCUSATE SODIUM 100 MG: 100 CAPSULE, LIQUID FILLED ORAL at 08:33

## 2017-03-07 RX ADMIN — HYDROCODONE BITARTRATE AND ACETAMINOPHEN 1 TABLET: 10; 325 TABLET ORAL at 03:44

## 2017-03-07 RX ADMIN — IPRATROPIUM BROMIDE AND ALBUTEROL SULFATE 3 ML: .5; 3 SOLUTION RESPIRATORY (INHALATION) at 07:24

## 2017-03-07 NOTE — DISCHARGE PLACEMENT REQUEST
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 notes of this type exist for this encounter.

## 2017-03-07 NOTE — PROGRESS NOTES
Continued Stay Note  [facility]     Patient Name: Kenan Do  MRN: [id number]  Today's Date: 3/7/2017    Admit Date: 3/6/2017          Discharge Plan       03/07/17 1501    Case Management/Social Work Plan    Plan Home with Home Health    Patient/Family In Agreement With Plan yes    Additional Comments Per USMAN Maldonado, patient needs a bedside commode as well as home OT. Called Galina with [facility], and she will deliver a BSC to patient's room. Spoke with Sabrina with [facility], and she will add occupational therapy to patient's home health order.       03/07/17 1429    Case Management/Social Work Plan    Plan Home with [facility]    Patient/Family In Agreement With Plan yes    Additional Comments ZOILA spoke to Katlyn with [facility] in [facility] to notify her of patient's discharge and faxed DC summary.              Discharge Codes     None        Expected Discharge Date and Time     Expected Discharge Date Expected Discharge Time    Mar 7, 2017             Janell Warren

## 2017-03-07 NOTE — DISCHARGE PLACEMENT REQUEST
"Kenan Ferraro (56 y.o. Male)     From [place],   [phone]      Date of Birth Social Security Number Address Home Phone MRN    1960  [id number] [id number] [phone] [id number]    Mandaeism Marital Status          Judaism        Admission Date Admission Type Admitting Provider Attending Provider Department, Room/Bed    3/6/17 Elective Yoel Brewster MD Ryan, Andrew W, MD [facility] 5E, S527/    Discharge Date Discharge Disposition Discharge Destination         Home or Self Care             Attending Provider: Yoel Brewster MD     Allergies:  No Known Allergies    Isolation:  None   Infection:  None   Code Status:  FULL    Ht:  72\" (182.9 cm)   Wt:  176 lb (79.8 kg)    Admission Cmt:  None   Principal Problem:  Status post total hip replacement, right anterior [Z96.641]                 Active Insurance as of 3/6/2017     Primary Coverage     Payor Plan Insurance Group Employer/Plan Group    HUMANA MEDICARE REPL HUMANA MEDICARE REPL [id number]     Payor Plan Address Payor Plan Phone Number Effective From Effective To    [address] [address] [phone] [address]     [place] [address]       Subscriber Name Subscriber Birth Date Member ID       KENAN FERRARO 1960 [id number]                 Emergency Contacts      (Rel.) Home Phone Work Phone Mobile Phone    Maritza Parrish (Mother) [phone] -- [phone]    Cholo Ferraro (Son) -- -- [phone]                 Discharge Summary      ANISH Tenorio at 3/7/2017  2:10 PM          Patient Name: Kenan Ferraro  MRN: [id number]  : 1960  DOS: 3/7/2017    Attending: Yoel Brewster MD    Primary Care Provider: William Wilson MD    Date of Admission:.3/6/2017  1:03 PM    Date of Discharge:  3/7/2017    Discharge Diagnosis: Principal Problem:    Status post total hip replacement, right anterior  Active Problems:    DM (diabetes " mellitus)    GERD (gastroesophageal reflux disease)    Tobacco abuse    Arthritis of right hip    Alcohol abuse    Cirrhosis    Thrombocytopenia, due to Cirrhosis and alcohol      Hospital Course  Patient is a 56 y.o. male presented for right RALPH by Dr. Brewster. He underwent surgery under GA ,  tolerated surgery well, and was admitted for further medical management.    The patient has done well postop. The patient has been able to ambulate with PT.  The patient has had good pain control with PO pain medications.   The patient was placed on DVT prophylaxis including aspirin. The patient was encouraged to use IS for atelectasis prophylaxis.   The patient was placed on a bowel regimen to prevent constipation while on pain medication.   The patient's H/H was monitored with a slight decrease that remained asymptomatic.    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      PROCEDURE PERFORMED: Right total hip arthroplasty via anterior approach using DePuy [place] 56 mm acetabular component with 3 superiorly directed fixation screws and neutral lip, neutral offset polyethylene insert; Press-Fit Corail stem size 12 with standard neck offset and calcar collar, and -2 x 36 mm head and neck adapter.      SURGEON: Yoel Brewster MD        Pertinent Test Results:    I reviewed the patient's new clinical results.     Results from last 7 days  Lab Units 03/07/17  0517 03/06/17  1732 03/01/17  1201   WBC 10*3/mm3 3.43* 5.65 3.35*   HEMOGLOBIN g/dL 9.0* 10.0* 13.1   HEMATOCRIT % 25.4* 29.0* 36.7*   PLATELETS 10*3/mm3 56* 78* 64*       Results from last 7 days  Lab Units 03/07/17  0517 03/06/17  1733 03/01/17  1201   SODIUM mmol/L  "134 139 135   POTASSIUM mmol/L 3.6 4.0 3.8   CHLORIDE mmol/L 101 106 100   TOTAL CO2 mmol/L 29.0 23.0 30.0   BUN mg/dL 10 6* 7*   CREATININE mg/dL 0.50* 0.50* 0.50*   CALCIUM mg/dL 8.8 9.2 10.7*   BILIRUBIN mg/dL  --   --  3.3*   ALK PHOS U/L  --   --  106*   ALT (SGPT) U/L  --   --  37   AST (SGOT) U/L  --   --  55*   GLUCOSE mg/dL 139* 128* 128*     I reviewed the patient's new imaging including images and reports.      Physical therapy:pod#1 up ambulating in hallway. Limited by general pain and weakness. Will continue to see for skilled PT.     Discharge Assessment:    Vital Signs  Visit Vitals   • /52 (BP Location: Right arm, Patient Position: Lying)   • Pulse 81   • Temp 100.1 °F (37.8 °C) (Tympanic)   • Resp 18   • Ht 72\" (182.9 cm)   • Wt 176 lb (79.8 kg)   • SpO2 95%   • BMI 23.87 kg/m2     Temp (24hrs), Av.3 °F (36.8 °C), Min:96.8 °F (36 °C), Max:100.1 °F (37.8 °C)      General Appearance:    Alert, cooperative, in no acute distress   Lungs:     Clear to auscultation,respirations regular, even and                   unlabored    Heart:    Regular rhythm and normal rate, normal S1 and S2   Abdomen:     Normal bowel sounds, no masses, no organomegaly, soft        non-tender, non-distended, no guarding, no rebound                 tenderness   Extremities:   Right hip Prineo CDI   Pulses:   Pulses palpable and equal bilaterally   Skin:   No bleeding, bruising or rash   Neurologic:   Cranial nerves 2 - 12 grossly intact, sensation intact.Flexion and dorsiflexion intact bilateral feet.       Discharge Disposition: Home    Discharge Medications   Kenan Do   Home Medication Instructions ENEDELIA:[id number]    Printed on:17 1411   Medication Information                      aspirin  MG tablet  Take 1 tablet by mouth Daily.             cyclobenzaprine (FLEXERIL) 10 MG tablet  Take 10 mg by mouth 3 (Three) Times a Day As Needed for muscle spasms.             Docusate Sodium (DSS) 100 MG " capsule  Take 100 mg by mouth 2 (Two) Times a Day.             folic acid (FOLVITE) 1 MG tablet  Take 1 mg by mouth Daily.             furosemide (LASIX) 20 MG tablet  Take 20 mg by mouth Daily.             gabapentin (NEURONTIN) 300 MG capsule  Take 300 mg by mouth 3 (Three) Times a Day.             lactulose (CHRONULAC) 10 GM/15ML solution  Take 20 g by mouth 2 (Two) Times a Day As Needed (constipation).             magnesium oxide (MAGOX) 400 (241.3 MG) MG tablet tablet  Take 400 mg by mouth 2 (Two) Times a Day.             metFORMIN (GLUCOPHAGE) 500 MG tablet  Take 500 mg by mouth Daily With Breakfast.             Multiple Vitamins-Minerals (MULTIVITAMIN ADULT PO)  Take 1 tablet by mouth Daily.             neomycin (MYCIFRADIN) 500 MG tablet  Take 500 mg by mouth 2 (Two) Times a Day.             omeprazole (priLOSEC) 20 MG capsule  Take 20 mg by mouth Daily.             oxyCODONE (ROXICODONE) 10 MG tablet  Take 1 tablet by mouth Every 4 (Four) Hours As Needed for moderate pain (4-6) for up to 10 days.             spironolactone (ALDACTONE) 25 MG tablet  Take 25 mg by mouth Daily.             zolpidem (AMBIEN) 10 MG tablet  Take 10 mg by mouth At Night As Needed for sleep.                 Discharge Diet: regular diet    Activity at Discharge: WBAT RLE    Follow-up Appointments  Dr. Brewster per his orders    Discharge took over 30 min.    ANISH Tenorio  03/07/17  2:11 PM     Electronically signed by ANISH Tenorio at 3/7/2017  2:13 PM        Discharge Order     Start     Ordered    03/07/17 1411  Discharge patient  Once     Expected Discharge Date:  03/07/17    Discharge Disposition:  Home or Self Care        03/07/17 1410

## 2017-03-07 NOTE — PLAN OF CARE
Problem: Patient Care Overview (Adult)  Goal: Plan of Care Review  Outcome: Ongoing (interventions implemented as appropriate)    03/07/17 1042   Coping/Psychosocial Response Interventions   Plan Of Care Reviewed With patient   Patient Care Overview   Progress progress toward functional goals as expected   Outcome Evaluation   Outcome Summary/Follow up Plan pod#1 up ambulating in hallway. Limited by general pain and weakness. Will continue to see for skilled PT.         Problem: Perioperative Period (Adult)  Goal: Signs and Symptoms of Listed Potential Problems Will be Absent or Manageable (Perioperative Period)  Outcome: Ongoing (interventions implemented as appropriate)    Problem: Hip Replacement, Total (Adult)  Goal: Signs and Symptoms of Listed Potential Problems Will be Absent or Manageable (Hip Replacement, Total)  Outcome: Ongoing (interventions implemented as appropriate)    03/07/17 1042   Hip Replacement, Total   Problems Assessed (Total Hip Replacement) pain   Problems Present (Total Hip Replacement) pain         Problem: Inpatient Physical Therapy  Goal: Bed Mobility Goal LTG- PT  Outcome: Ongoing (interventions implemented as appropriate)  Goal: Transfer Training Goal 1 LTG- PT  Outcome: Ongoing (interventions implemented as appropriate)    03/07/17 1042   Transfer Training PT LTG   Transfer Training PT LTG, Date Established 03/07/17   Transfer Training PT LTG, Time to Achieve 5 - 7 days   Transfer Training PT LTG, Activity Type all transfers   Transfer Training PT LTG, [place] Level supervision required   Transfer Training PT LTG, Assist Device walker, rolling   Transfer Training PT LTG, Outcome goal ongoing       Goal: Gait Training Goal LTG- PT  Outcome: Ongoing (interventions implemented as appropriate)    03/07/17 1042   Gait Training PT LTG   Gait Training Goal PT LTG, Date Established 03/07/17   Gait Training Goal PT LTG, Time to Achieve 5 - 7 days   Gait Training Goal PT LTG, [place]  Level supervision required   Gait Training Goal PT LTG, Assist Device walker, rolling   Gait Training Goal PT LTG, Distance to Achieve 400   Gait Training Goal PT LTG, Outcome goal ongoing

## 2017-03-07 NOTE — DISCHARGE SUMMARY
Patient Name: Kenan Do  MRN: [id number]  : 1960  DOS: 3/7/2017    Attending: Yoel Brewster MD    Primary Care Provider: William Wislon MD    Date of Admission:.3/6/2017  1:03 PM    Date of Discharge:  3/7/2017    Discharge Diagnosis: Principal Problem:    Status post total hip replacement, right anterior  Active Problems:    DM (diabetes mellitus)    GERD (gastroesophageal reflux disease)    Tobacco abuse    Arthritis of right hip    Alcohol abuse    Cirrhosis    Thrombocytopenia, due to Cirrhosis and alcohol      Hospital Course  Patient is a 56 y.o. male presented for right RALPH by Dr. Brewster. He underwent surgery under GA ,  tolerated surgery well, and was admitted for further medical management.    The patient has done well postop. The patient has been able to ambulate with PT, and did well.  He was placed on CIWA screens during his short stay, and has exhibited no signs of alcohol withdrawal. He was given thiamine and folic acid while here.    The patient has had good pain control with PO pain medications.   The patient was placed on DVT prophylaxis including aspirin. The patient was encouraged to use IS for atelectasis prophylaxis.     The patient was placed on a bowel regimen to prevent constipation while on pain medication.   The patient's H/H was monitored with a slight decrease that remained asymptomatic.    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      PROCEDURE PERFORMED: Right total hip arthroplasty via anterior approach using DePuy [place] 56 mm acetabular component with 3 superiorly directed fixation screws and neutral lip, neutral offset polyethylene insert;  "Press-Fit Corail stem size 12 with standard neck offset and calcar collar, and -2 x 36 mm head and neck adapter.      SURGEON: Yoel Brewster MD        Pertinent Test Results:    I reviewed the patient's new clinical results.     Results from last 7 days  Lab Units 17  0517 17  1732 17  1201   WBC 10*3/mm3 3.43* 5.65 3.35*   HEMOGLOBIN g/dL 9.0* 10.0* 13.1   HEMATOCRIT % 25.4* 29.0* 36.7*   PLATELETS 10*3/mm3 56* 78* 64*       Results from last 7 days  Lab Units 17  0517 17  1733 17  1201   SODIUM mmol/L 134 139 135   POTASSIUM mmol/L 3.6 4.0 3.8   CHLORIDE mmol/L 101 106 100   TOTAL CO2 mmol/L 29.0 23.0 30.0   BUN mg/dL 10 6* 7*   CREATININE mg/dL 0.50* 0.50* 0.50*   CALCIUM mg/dL 8.8 9.2 10.7*   BILIRUBIN mg/dL  --   --  3.3*   ALK PHOS U/L  --   --  106*   ALT (SGPT) U/L  --   --  37   AST (SGOT) U/L  --   --  55*   GLUCOSE mg/dL 139* 128* 128*     I reviewed the patient's new imaging including images and reports.      Physical therapy:pod#1 up ambulating in hallway. Limited by general pain and weakness. Will continue to see for skilled PT.     Discharge Assessment:    Vital Signs  Visit Vitals   • /52 (BP Location: Right arm, Patient Position: Lying)   • Pulse 81   • Temp 100.1 °F (37.8 °C) (Tympanic)   • Resp 18   • Ht 72\" (182.9 cm)   • Wt 176 lb (79.8 kg)   • SpO2 95%   • BMI 23.87 kg/m2     Temp (24hrs), Av.3 °F (36.8 °C), Min:96.8 °F (36 °C), Max:100.1 °F (37.8 °C)      General Appearance:    Alert, cooperative, in no acute distress   Lungs:     Clear to auscultation,respirations regular, even and                   unlabored    Heart:    Regular rhythm and normal rate, normal S1 and S2   Abdomen:     Normal bowel sounds, no masses, no organomegaly, soft        non-tender, non-distended, no guarding, no rebound                 tenderness   Extremities:   Right hip Prineo CDI   Pulses:   Pulses palpable and equal bilaterally   Skin:   No bleeding, bruising or rash "   Neurologic:   Cranial nerves 2 - 12 grossly intact, sensation intact.Flexion and dorsiflexion intact bilateral feet.       Discharge Disposition: Home    Discharge Medications   Kenan Do   Home Medication Instructions ENEDELIA:[id number]    Printed on:03/07/17 1411   Medication Information                      aspirin  MG tablet  Take 1 tablet by mouth Daily.             cyclobenzaprine (FLEXERIL) 10 MG tablet  Take 10 mg by mouth 3 (Three) Times a Day As Needed for muscle spasms.             Docusate Sodium (DSS) 100 MG capsule  Take 100 mg by mouth 2 (Two) Times a Day.             folic acid (FOLVITE) 1 MG tablet  Take 1 mg by mouth Daily.             furosemide (LASIX) 20 MG tablet  Take 20 mg by mouth Daily.             gabapentin (NEURONTIN) 300 MG capsule  Take 300 mg by mouth 3 (Three) Times a Day.             lactulose (CHRONULAC) 10 GM/15ML solution  Take 20 g by mouth 2 (Two) Times a Day As Needed (constipation).             magnesium oxide (MAGOX) 400 (241.3 MG) MG tablet tablet  Take 400 mg by mouth 2 (Two) Times a Day.             metFORMIN (GLUCOPHAGE) 500 MG tablet  Take 500 mg by mouth Daily With Breakfast.             Multiple Vitamins-Minerals (MULTIVITAMIN ADULT PO)  Take 1 tablet by mouth Daily.             neomycin (MYCIFRADIN) 500 MG tablet  Take 500 mg by mouth 2 (Two) Times a Day.             omeprazole (priLOSEC) 20 MG capsule  Take 20 mg by mouth Daily.             oxyCODONE (ROXICODONE) 10 MG tablet  Take 1 tablet by mouth Every 4 (Four) Hours As Needed for moderate pain (4-6) for up to 10 days.             spironolactone (ALDACTONE) 25 MG tablet  Take 25 mg by mouth Daily.             zolpidem (AMBIEN) 10 MG tablet  Take 10 mg by mouth At Night As Needed for sleep.                 Discharge Diet: regular diet    Activity at Discharge: WBAT RLE    Follow-up Appointments  Dr. Brewster per his orders    Discharge took over 30 min.    ANISH Tenorio  03/07/17  2:11 PM    Seen and examined by me. Agree with above. Discussed with patient.

## 2017-03-07 NOTE — PLAN OF CARE
Problem: Patient Care Overview (Adult)  Goal: Plan of Care Review  Outcome: Outcome(s) achieved Date Met:  03/07/17 03/07/17 1607   Coping/Psychosocial Response Interventions   Plan Of Care Reviewed With patient   Patient Care Overview   Progress progress toward functional goals as expected   Outcome Evaluation   Outcome Summary/Follow up Plan Going home with home health. Has a walker. Meeting goals.         Problem: Inpatient Physical Therapy  Goal: Bed Mobility Goal LTG- PT  Outcome: Ongoing (interventions implemented as appropriate)    03/07/17 1042 03/07/17 1607   Bed Mobility PT LTG   Bed Mobility PT LTG, Date Established 03/07/17 --    Bed Mobility PT LTG, Time to Achieve 5 - 7 days --    Bed Mobility PT LTG, Activity Type all bed mobility --    Bed Mobility PT LTG, [place] Level conditional independence --    Bed Mobility PT Goal LTG, Assist Device bed rails --    Bed Mobility PT LTG, Outcome --  goal met       Goal: Transfer Training Goal 1 LTG- PT  Outcome: Ongoing (interventions implemented as appropriate)    03/07/17 1042 03/07/17 1607   Transfer Training PT LTG   Transfer Training PT LTG, Date Established 03/07/17 --    Transfer Training PT LTG, Time to Achieve 5 - 7 days --    Transfer Training PT LTG, Activity Type all transfers --    Transfer Training PT LTG, [place] Level supervision required --    Transfer Training PT LTG, Assist Device walker, rolling --    Transfer Training PT LTG, Outcome --  goal met       Goal: Gait Training Goal LTG- PT  Outcome: Outcome(s) achieved Date Met:  03/07/17 03/07/17 1042 03/07/17 1607   Gait Training PT LTG   Gait Training Goal PT LTG, Date Established 03/07/17 --    Gait Training Goal PT LTG, Time to Achieve 5 - 7 days --    Gait Training Goal PT LTG, [place] Level supervision required --    Gait Training Goal PT LTG, Assist Device walker, rolling --    Gait Training Goal PT LTG, Distance to Achieve 400 --    Gait Training Goal PT LTG, Outcome  --  goal met
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
## 2017-03-07 NOTE — PROGRESS NOTES
Discharge Planning Assessment  [facility]     Patient Name: Kenan Do  MRN: [id number]  Today's Date: 3/7/2017    Admit Date: 3/6/2017          Discharge Needs Assessment       03/07/17 1109    Living Environment    Lives With alone    Living Arrangements mobile home    Provides Primary Care For no one    Quality Of Family Relationships supportive;helpful;involved    Able to Return to Prior Living Arrangements yes    Discharge Needs Assessment    Concerns To Be Addressed discharge planning concerns    Readmission Within The Last 30 Days no previous admission in last 30 days    Equipment Currently Used at Home cane, straight    Equipment Needed After Discharge walker, rolling    Discharge Facility/Level Of Care Needs home with home health    Transportation Available car;family or friend will provide    Discharge Disposition still a patient    Discharge Contact Information if Applicable Chantal Parrish, mother, [phone] ([phone]), or Mau Do, son, [phone]            Discharge Plan       03/07/17 1114    Case Management/Social Work Plan    Plan Home with home health    Patient/Family In Agreement With Plan yes    Additional Comments Met with patient in the room to initiate discharge planning. Patient lives alone in a mobile home in [facility]. He is independent with ADLs and occasionally uses a straight cane when ambulating. His goal is home with home health PT at discharge and he would like to use [facility] in [facility] ph. [phone]. Referral called to Zayra and ZOILA will fax order and clinical to [phone]. She states they can accept patient, and CM will notify [facility] when patient is discharged. Of note, patient will be staying with his mother for several days so that he will not be home alone and home health company is aware. Patient has also requested a rolling walker and would like to use [facility]. Order and clinical provided to Galina with [facility], and she will  deliver a walker to patient's room. CM will continue to follow.        Discharge Placement     No information found        Expected Discharge Date and Time     Expected Discharge Date Expected Discharge Time    Mar 8, 2017               Demographic Summary       03/07/17 1108    Referral Information    Referral Source admission list    Reason For Consult discharge planning    Record Reviewed history and physical    Contact Information    Permission Granted to Share Information With ;family/designee   mother, Chantal, or son, Mau    Primary Care Physician Information    Name William Bennett Steve            Functional Status       03/07/17 1108    Functional Status Prior    Ambulation 0-->independent    Transferring 0-->independent    Toileting 0-->independent    Bathing 0-->independent    Dressing 0-->independent    Eating 0-->independent    Communication 0-->understands/communicates without difficulty    Swallowing 0-->swallows foods/liquids without difficulty    Employment/Financial    Employment/Finance Comments Medical and rx coverage through [employer]; no issues affording meds; uses [facility] pharmacy in [facility]     None            Abuse/Neglect     None            Legal     None            Substance Abuse     None            Patient Forms     None          Janell Warren

## 2017-03-07 NOTE — PROGRESS NOTES
Continued Stay Note  [facility]     Patient Name: Kenan Do  MRN: [id number]  Today's Date: 3/7/2017    Admit Date: 3/6/2017          Discharge Plan       03/07/17 1429    Case Management/Social Work Plan    Plan Home with [facility]    Patient/Family In Agreement With Plan yes    Additional Comments CM spoke to Katlyn with [facility] in [facility] to notify her of patient's discharge and faxed DC summary.              Discharge Codes     None        Expected Discharge Date and Time     Expected Discharge Date Expected Discharge Time    Mar 7, 2017             Janell Warren

## 2017-03-07 NOTE — CONSULTS
Diabetes Education  Assessment/Teaching    Patient Name:  Kenan Do  YOB: 1960  MRN: [id number]  Admit Date:  3/6/2017      Assessment Date:  3/7/2017       Most Recent Value    General Information      Referral From:  MD lowry [A1C was 4.8%. Hx DM 10 yr+]    Height  6' (1.829 m)    Height Method  Stated    Weight  176 lb (79.8 kg)    Weight Method  Stated    Pregnancy Assessment     Diabetes History     What type of diabetes do you have?  Type 2    Length of Diabetes Diagnosis  10 + years    Current DM knowledge  good    Have you had diabetes education/teaching in the past?  yes    Do you test your blood sugar at home?  yes    Frequency of checks  daily    Who performs the test?  self    Have you had low blood sugar? (<70mg/dl)  no    Have you had high blood sugar? (>140mg/dl)  no    Education Preferences     Nutrition Information     Assessment Topics     Healthy Eating - Assessment  Competent    Being Active - Assessment  Competent [plans to do PT and become active again]    Taking Medication - Assessment  Competent [able to tell me about his metformin. Stated he was IDDM years ago and lost a lot of weight]    Problem Solving - Assessment  Competent    Reducing Risk - Assessment  Competent    Healthy Coping - Assessment  Competent    Monitoring - Assessment  Competent    DM Goals                Most Recent Value    DM Education Needs     Meter  Has own    Frequency of Testing  Daily    Blood Glucose Target  -- [discussed need to test 1-2 times a day and report to PCP if >180 2 test ac. Discussed need for good glucose control and wound healing]    Medication  Oral    Problem Solving  Hypoglycemia, Hyperglycemia, Sick days, Signs, Symptoms, Treatment    Reducing Risks  A1C testing    Physical Activity  Other (comment) [stated he was very active prior to problems with his hip and is hoping to be again]    Healthy Coping  Appropriate    Discharge Plan  Home    Motivation  Engaged, Strong     Teaching Method  Explanation, Discussion, Teach back    Patient Response  Verbalized understanding            Other Comments:  Patient was seen for review of home management of type 2 diabetes. Discussed and taught patient about type 2 diabetes self-management, risk factors, and importance of blood glucose control to reduce complications. Target blood glucose readings and A1c goals per ADA were reviewed. Reviewed with patient current A1c and discussed its significance. Signs, symptoms and treatment of hyperglycemia and hypoglycemia were discussed. Lifestyle changes such as physical activity with MD approval and healthy eating were encouraged. Discussed good glucose control necessary for wound healing. Patient was encouraged to keep record of blood glucose readings to take to follow up appointment with PCP.  OP education was also encouraged for additional education once discharged.          Electronically signed by:  Deloris Mcbride RN  03/07/17 2:56 PM

## 2017-03-07 NOTE — PLAN OF CARE
Problem: Patient Care Overview (Adult)  Goal: Plan of Care Review  Outcome: Unable to achieve outcome(s) by discharge Date Met:  03/07/17 03/07/17 1615   Coping/Psychosocial Response Interventions   Plan Of Care Reviewed With patient   Outcome Evaluation   Outcome Summary/Follow up Plan OT evaluation compelte. Recommend IPR and pt declined. Then recommended that pt delay discharge to tomorrow and pt declined. Recommend HHOT, bedside commode, and tub bench. Mother present and they both state they are comfortable with discharge home today.          Problem: Inpatient Occupational Therapy  Goal: Transfer Training Goal 1 LTG- OT  Outcome: Unable to achieve outcome(s) by discharge Date Met:  03/07/17 03/07/17 1615   Transfer Training OT LTG   Transfer Training OT LTG, Date Established 03/07/17   Transfer Training OT LTG, Time to Achieve by discharge   Transfer Training OT LTG, Activity Type bed to chair /chair to bed;sit to stand/stand to sit   Transfer Training OT LTG, [place] Level verbal cues required;contact guard assist   Transfer Training OT LTG, Assist Device walker, rolling   Transfer Training OT LTG, Outcome goal not met   Transfer Training OT LTG, Reason Goal Not Met discharged from facility       Goal: Patient Education Goal LTG- OT  Outcome: Unable to achieve outcome(s) by discharge Date Met:  03/07/17 03/07/17 1615   Patient Education OT LTG   Patient Education OT LTG, Date Established 03/07/17   Patient Education OT LTG, Time to Achieve by discharge   Patient Education OT LTG, Education Type precautions per surgeon;adaptive equipment mgmt;home safety;1 hand/anival technique   Patient Education OT LTG, Education Understanding verbalizes understanding;demonstrates adequately   Patient Education OT LTG Outcome goal not met   Patient Education OT LTG, Reason Goal Not Met discharged from facility       Goal: LB Dressing Goal LTG- OT  Outcome: Unable to achieve outcome(s) by discharge Date Met:   03/07/17 03/07/17 1615   LB Dressing OT LTG   LB Dressing Goal OT LTG, Date Established 03/07/17   LB Dressing Goal OT LTG, Time to Achieve by discharge   LB Dressing Goal OT LTG, Activity Type Pt will complete LB dressing task    LB Dressing Goal OT LTG, [place] Level contact guard assist;verbal cues required   LB Dressing Goal OT LTG, Outcome goal not met   LB Dressing Goal OT LTG, Reason Goal Not Met discharged from facility

## 2017-03-07 NOTE — OP NOTE
DATE OF PROCEDURE:  03/06/2017    PREOPERATIVE DIAGNOSIS: Right hip osteoarthritis with acetabular marginal bone loss.     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     PROCEDURE PERFORMED: Right total hip arthroplasty via anterior approach using DePuy [place] 56 mm acetabular component with 3 superiorly directed fixation screws and neutral lip, neutral offset polyethylene insert; Press-Fit Corail stem size 12 with standard neck offset and calcar collar, and -2 x 36 mm head and neck adapter.     SURGEON: Yoel Brewster MD     ASSISTANT: LUKE Cordero     ANESTHESIA: General.     INDICATIONS: A 56-year-old drinker with right hip pain and progressive limb length shortening. He has advanced from a single can to 2 can over the last several months. X-rays have shown destruction of the superolateral acetabular margin, as well as destruction of the femoral head. He has worsening acetabular bone loss over the last several months. At one point, he was scheduled for surgery, but this was postponed due to low platelet count and incomplete gastrointestinal work-up for hepatic changes including thrombocytopenia, elevated PT/INR and elevated liver enzymes. The chemistries have improved with reduced drinking, but he still has thrombocytopenia and marginally elevated PT/INR. Risks, benefits, indications, and rationale for right total hip arthroplasty were discussed at length with the patient. He was advised on the possibility of mechanical complications due to the technical difficulty of the acetabular work. He was advised on the possibility of bleeding disorder and transfusion, as well as other more serious medical perioperative complications. He signs his own consent after all questions were answered.     DESCRIPTION OF PROCEDURE: While in the OR, general anesthetic was started. He was kept in the supine position and transferred to the Rodney table and prepped and draped in a  standard fashion for an anterior approach to the right hip. Fluoroscopy was used to assess limb lengths. With traction, the right side was close to the left and there was near radiographic equality with the final component selection and position. Two units of platelets were given intraoperatively to assist with blood loss. A standard anterior incision was made and routine anterior approach to the hip capsule was performed. Fluoroscopy was used to assess limb lengths. Crossing vessels were controlled with electrocautery. A T-shaped capsulotomy was created again with fluoroscopic guidance. The femoral neck was transected and then the head was kept in place hoping to utilize some of the bone for local graft due to the superolateral acetabular margin loss. The femoral head was removed piecemeal. There was no successful direct approach to the acetabulum and cartilage margin appeared on the bone surface, which would require denuding. After the superolateral bony fragment was removed this was found to have more eburnated bone. However, the depth of the acetabulum appeared satisfactory for more or less routine components and technique. The inferior acetabular margin was identified and used as a landmark as was the intercondylar notch. We reamed from size 47 to size 55 with fluoroscopic guidance and good exposure of subchondral bone without crossing the medial wall in the inferomedial portions. Bleeding from the inferior capsular vessel was controlled with electrocautery. After reaming to size 56, which was the approximate size for the left hip, there was excellent exposure of subchondral bone and trial showed good radiographic sizing. Trial was removed and final component was seated with less than optimal press-fit characteristics. Three superiorly directed fixation screws were placed with excellent bony purchase on all 3. The apex of the component was well medial to the superolateral margin and thus buttress with the local  bone graft was felt to be no longer indicated. Horizontal tilt was thought to be between 40° to 45° from Hilgenreiner's line and anteversion was thought to be about 25° which is enough to recess the component under the anterior-inferior bony margin, but not excessively. Polyethylene insert was placed without difficulty. Acetabulum was thoroughly irrigated before, during, and after component placement. Note that there was some bleeding from the medial capsule controlled with pressure and no singular vessel appeared present. Attention was then turned to the proximal femur. The trochanteric hook was placed in the apex and rotated initially to 150° and eventually to 160° after comprehensive posterolateral releases. The piriformis landmarks led to position far lateral to the initially expected, but had excellent position on fluoroscopy. We broached from size 8 to size 11 with good press-fit characteristics of the size 11, although this was excessively deep. A size 12 broach was passed and then calcar reamer passed. Trials showed good approximation of limb lengths with the +1.5 x 36 mm if not slightly long. Trial was removed and bleeding appeared to be moderate. Again, there was no singular vessel. Bleeding did seem to be controlled with direct pressure from the retractors. The canal was then thoroughly irrigated. The final component was seated at less than optimal depth and thus the -2 x 36 mm head and neck adapter was selected. Final components were assembled and reduced again. Again, bleeding was rather diffuse from posterior and improved after closing the anterior capsule with suture. The wound was thoroughly irrigated and closed in layers without a drain. The joint space was injected with a combination of ropivacaine and Marcaine with epinephrine through a spinal needle placed under direct vision prior to closure. A standard Prineo dressing was applied. The patient was stable to recovery having tolerated the  procedure well throughout with all counts correct. He demonstrated good plantarflexion and dorsiflexion of the toes in recovery.       MD JOANNE Babb/cynthia  DD: 03/06/2017 17:49:56  DT: 03/06/2017 20:10:08  Voice Rec. ID #[id number]  Voice Original ID #[id number]  Doc ID #[id number]  Rev. #0  cc:

## 2017-03-07 NOTE — PLAN OF CARE
Problem: Patient Care Overview (Adult)  Goal: Plan of Care Review  Outcome: Ongoing (interventions implemented as appropriate)    03/07/17 0621   Coping/Psychosocial Response Interventions   Plan Of Care Reviewed With patient   Patient Care Overview   Progress improving   Outcome Evaluation   Outcome Summary/Follow up Plan Pt. rested well with some pain. He was well controlled well with good PO.
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
## 2017-03-07 NOTE — THERAPY DISCHARGE NOTE
Acute Care - Physical Therapy Treatment Note/Discharge  [facility]     Patient Name: Kenan Do  : 1960  MRN: [id number]  Today's Date: 3/7/2017  Onset of Illness/Injury or Date of Surgery Date: (P) 17  Date of Referral to PT: 17  Referring Physician: MD Brewster (P)    Admit Date: 3/6/2017    Visit Dx:    ICD-10-CM ICD-9-CM   1. Impaired functional mobility, balance, gait, and endurance Z74.09 V49.89   2. Status post total hip replacement, right anterior Z96.641 V43.64   3. Impaired mobility and ADLs Z74.09 799.89     Patient Active Problem List   Diagnosis   • DM (diabetes mellitus)   • GERD (gastroesophageal reflux disease)   • Tobacco abuse   • Arthritis of right hip   • Status post total hip replacement, right anterior   • Alcohol abuse   • Cirrhosis   • Thrombocytopenia, due to Cirrhosis and alcohol       Physical Therapy Education     Title: PT OT SLP Therapies (Done)     Topic: Physical Therapy (Done)     Point: Mobility training (Done)    Learning Progress Summary    Learner Readiness Method Response Comment Documented by Status   Patient SERGIO Hannah,MARLINE,H MAURIZIO DE LEÓN reviewed safety with gait and stairs  reviewed hep SC 17 1606 Done    MAURIZIO Mcgregor,NR reviewed benefits of exercise  hep  safety with mobility SC 17 1041 Done               Point: Home exercise program (Done)    Learning Progress Summary    Learner Readiness Method Response Comment Documented by Status   Patient SERGIO Hannah D,H MAURIZIO DE LEÓN reviewed safety with gait and stairs  reviewed hep SC 17 1606 Done    MAURIZIO Mcgregor,NR reviewed benefits of exercise  hep  safety with mobility SC 17 1041 Done               Point: Body mechanics (Done)    Learning Progress Summary    Learner Readiness Method Response Comment Documented by Status   Patient SERGIO Hannah,MARLINE,H MAURIZIO DE LEÓN reviewed safety with gait and stairs  reviewed hep SC 17 1606 Done    MAURIZIO Mcgregor,NR reviewed benefits of exercise  hep  safety with  mobility SC 03/07/17 1041 Done               Point: Precautions (Done)    Learning Progress Summary    Learner Readiness Method Response Comment Documented by Status   Patient Ramya DAVEY,TB,D,H MAURIZIO DE LEÓN reviewed safety with gait and stairs  reviewed hep SC 03/07/17 1606 Done    MAURIZIO Mcgregor,NR reviewed benefits of exercise  hep  safety with mobility SC 03/07/17 1041 Done                      User Key     Initials Effective Dates Name Provider Type Discipline    SC 06/19/15 -  Shearon A Birdie, PT Physical Therapist PT                    IP PT Goals       03/07/17 1607 03/07/17 1042       Bed Mobility PT LTG    Bed Mobility PT LTG, Date Established  03/07/17  -SC     Bed Mobility PT LTG, Time to Achieve  5 - 7 days  -SC     Bed Mobility PT LTG, Activity Type  all bed mobility  -SC     Bed Mobility PT LTG, [place] Level  conditional independence  -SC     Bed Mobility PT Goal  LTG, Assist Device  bed rails  -SC     Bed Mobility PT LTG, Outcome goal met  -SC goal ongoing  -SC     Transfer Training PT LTG    Transfer Training PT LTG, Date Established  03/07/17  -SC     Transfer Training PT LTG, Time to Achieve  5 - 7 days  -SC     Transfer Training PT LTG, Activity Type  all transfers  -SC     Transfer Training PT LTG, [place] Level  supervision required  -SC     Transfer Training PT LTG, Assist Device  walker, rolling  -SC     Transfer Training PT LTG, Outcome goal met  -SC goal ongoing  -SC     Gait Training PT LTG    Gait Training Goal PT LTG, Date Established  03/07/17  -SC     Gait Training Goal PT LTG, Time to Achieve  5 - 7 days  -SC     Gait Training Goal PT LTG, [place] Level  supervision required  -SC     Gait Training Goal PT LTG, Assist Device  walker, rolling  -SC     Gait Training Goal PT LTG, Distance to Achieve  400  -SC     Gait Training Goal PT LTG, Outcome goal met  -SC goal ongoing  -SC       User Key  (r) = Recorded By, (t) = Taken By, (c) = Cosigned By    Initials Name Provider Type    SC  Kiran Packer, PT Physical Therapist              Adult Rehabilitation Note       03/07/17 1346 03/07/17 1320       Rehab Assessment/Intervention    Discipline (P)  occupational therapist  -HK physical therapist  -SC     Document Type  therapy note (daily note)  -SC     Subjective Information (P)  no complaints;agree to therapy  - pain;agree to therapy;no complaints  -SC     Patient Effort, Rehab Treatment (P)  good  -HK good  -SC     Symptoms Noted During/After Treatment  none  -SC     Precautions/Limitations (P)  anterior hip precautions- right   Leg length discrepency   -HK anterior hip precautions- right  -SC     Equipment Issued to Patient (P)  bathing equipment;dressing equipment   LH sponge, LH shoe horn, leg , reacher, sock aid  -HK      Recorded by [HK] Marisol Parikh, OT Student [SC] Kiran Packer, PT     Pain Assessment    Pain Assessment (P)  0-10  -HK Nuñez-Baker FACES  -SC     Nuñez-Foley FACES Pain Rating (P)  6  -HK 2   4  -SC     Pain Score (P)  6  -HK      Pain Type (P)  Acute pain  -HK Acute pain  -SC     Pain Location (P)  Hip  -HK Hip  -SC     Pain Orientation (P)  Right  -HK Right  -SC     Pain Intervention(s) (P)  Repositioned;Ambulation/increased activity  - Repositioned  -SC     Response to Interventions (P)  Pt tolerated  -HK      Recorded by [HK] Marisol Parikh, OT Student [SC] Kiran Packer, PT     Cognitive Assessment/Intervention    Current Cognitive/Communication Assessment (P)  functional  -HK functional  -SC     Orientation Status (P)  oriented x 4  -HK oriented x 4  -SC     Follows Commands/Answers Questions (P)  100% of the time;able to follow single-step instructions  - 100% of the time  -SC     Personal Safety (P)  mild impairment;decreased awareness, need for assist;decreased awareness, need for safety;impulsive;decreased insight to deficits  -HK WNL/WFL  -SC     Personal Safety Interventions (P)  gait belt;fall prevention program maintained;nonskid shoes/slippers when  out of bed  -HK gait belt  -SC     Recorded by [HK] Marisol Parikh, OT Student [SC] Kiran Packer, PT     ROM (Range of Motion)    General ROM (P)  --   functional for evaluation  -HK      Recorded by [HK] Marisol Parikh, OT Student      MMT (Manual Muscle Testing)    General MMT Assessment (P)  --   functional for evaluation  -HK      Recorded by [HK] Marisol Parikh OT Student      Bed Mobility, Assessment/Treatment    Bed Mobility, Assistive Device (P)  bed rails;head of bed elevated;leg   -      Bed Mobility, Scoot/Bridge, Neche (P)  supervision required;verbal cues required  -      Bed Mob, Supine to Sit, Neche (P)  supervision required;verbal cues required  -HK      Bed Mob, Sit to Supine, Neche  independent  -SC     Bed Mobility, Safety Issues (P)  decreased use of legs for bridging/pushing  -HK      Bed Mobility, Impairments (P)  impaired balance;strength decreased  -      Bed Mobility, Comment (P)  pt issued and educated on use of  leg ; Pt used leg  to move leg to EOB  - cues for getting back into bed, Patient did not take PT's advice.  -SC     Recorded by [HK] Marisol Parikh, OT Student [SC] Kiran Packer, PT     Transfer Assessment/Treatment    Transfers, Sit-Stand Neche (P)  stand by assist;verbal cues required  - supervision required  -SC     Transfers, Stand-Sit Neche (P)  stand by assist;verbal cues required  - supervision required  -SC     Transfers, Sit-Stand-Sit, Assist Device (P)  rolling walker  -HK rolling walker  -SC     Transfer, Safety Issues (P)  balance decreased during turns;step length decreased;weight-shifting ability decreased;impulsivity  -HK      Transfer, Impairments (P)  pain;strength decreased  -HK pain;impaired balance;strength decreased  -SC     Transfer, Comment (P)  Pt requires cueing to extend right leg when sitting, cues for hand placement, cues for correct approach to sitting.    -HK demonstrated good technique  -SC      Recorded by [] Marisol Parikh, OT Student [SC] Kiran Packer, PT     Gait Assessment/Treatment    Gait, [place] Level  supervision required  -SC     Gait, Assistive Device  rolling walker  -SC     Gait, Distance (Feet)  400  -SC     Gait, Gait Pattern Analysis  swing-through gait  -SC     Gait, Gait Deviations  glendy decreased;right:;antalgic;weight-shifting ability decreased  -SC     Gait, Safety Issues  balance decreased during turns  -SC     Gait, Impairments  impaired balance;strength decreased  -SC     Gait, Comment  cues for staying in walker  -SC     Recorded by  [SC] Kiran Packer, PT     Stairs Assessment/Treatment    Number of Stairs  3  -SC     Stairs, Handrail Location  left side (ascending)  -SC     Stairs, [place] Level  supervision required  -SC     Stairs, Assistive Device  straight cane  -SC     Stairs, Safety Issues  balance decreased during turns  -SC     Stairs, Impairments  motor control impaired;pain  -SC     Stairs, Comment  Needed repeted cues to sequence steps  -SC     Recorded by  [SC] Kiran Packer, PT     Functional Mobility    Functional Mobility- Ind. Level (P)  minimum assist (75% patient effort);verbal cues required   Pt contact guard with min assist at end with fatigue  -      Functional Mobility- Device (P)  rolling walker  -      Functional Mobility-Distance (Feet) (P)  100  -      Functional Mobility- Comment (P)  Pt with R lateral lean when fatigued; Pt requires cueing for keeping walker close to body when sitting   -      Recorded by [] Marisol Parikh, OT Student      Lower Body Bathing Assessment/Training    LB Bathing Assess/Train Assistive Device (P)  long-handled sponge  -      LB Bathing Assess/Train, Position (P)  edge of bed  -      LB Bathing Assess/Train, [place] Level (P)  supervision required;verbal cues required  -      LB Bathing Assess/Train, Impairments (P)  strength decreased;impaired balance  -      LB Bathing  Assess/Train, Comment (P)  Pt issued and educated on LH sponge; pt simulated use  -HK      Recorded by [] Marisol Parikh, OT Student      Lower Body Dressing Assessment/Training    LB Dressing Assess/Train, Clothing Type (P)  donning:;pants;socks  -      LB Dressing Assess/Train, Assist Device (P)  sock-aid  -      LB Dressing Assess/Train, Position (P)  sitting;edge of bed  -HK      LB Dressing Assess/Train, [place] (P)  minimum assist (75% patient effort);verbal cues required  -      LB Dressing Assess/Train, Impairments (P)  decreased flexibility;strength decreased;impaired balance  -      LB Dressing Assess/Train, Comment (P)  Issued AE (sock aid, reacher, shoe horn, LH sponge, leg ) and educated pt on use  -HK      Recorded by [] USMAN Olsen      Therapy Exercises    Exercise Protocols  total hip  -SC     Total Hip Exercises  right:;10 reps;ankle pumps/circles;quad set;glut set;heel slides;hip abduction  -SC     Recorded by  [SC] Kiran Packer, PT     Positioning and Restraints    Pre-Treatment Position (P)  in bed  -HK sitting in chair/recliner  -SC     Post Treatment Position (P)  bed  -HK bed  -SC     In Bed (P)  notified nsg;sitting EOB;call light within reach;encouraged to call for assist;with family/caregiver  - supine;notified nsg;with OT  -SC     Recorded by [] Marisol Parikh, OT Student [SC] Kiran Packer, PT       User Key  (r) = Recorded By, (t) = Taken By, (c) = Cosigned By    Initials Name Effective Dates    SC Kiran Packer, PT 06/19/15 -      Marisol Parikh, OT Student 12/19/16 -           PT Recommendation and Plan  Anticipated Equipment Needs At Discharge: front wheeled walker  Anticipated Discharge Disposition: home with home health  Planned Therapy Interventions: gait training, bed mobility training, home exercise program, patient/family education, strengthening, transfer training  PT Frequency: 2 times/day  Plan of Care Review  Plan Of Care Reviewed  With: patient  Progress: progress toward functional goals as expected  Outcome Summary/Follow up Plan: Going home with home health. Has a walker. Meeting goals.          Outcome Measures       03/07/17 1320 03/07/17 0825       How much help from another person do you currently need...    Turning from your back to your side while in flat bed without using bedrails? 4  -SC 3  -SC     Moving from lying on back to sitting on the side of a flat bed without bedrails? 4  -SC 2  -SC     Moving to and from a bed to a chair (including a wheelchair)? 4  -SC 3  -SC     Standing up from a chair using your arms (e.g., wheelchair, bedside chair)? 4  -SC 3  -SC     Climbing 3-5 steps with a railing? 3  -SC 3  -SC     To walk in hospital room? 4  -SC 3  -SC     AM-PAC 6 Clicks Score 23  -SC 17  -SC     Functional Assessment    Outcome Measure Options AM-PAC 6 Clicks Basic Mobility (PT)  -SC AM-PAC 6 Clicks Basic Mobility (PT)  -SC       User Key  (r) = Recorded By, (t) = Taken By, (c) = Cosigned By    Initials Name Provider Type    SC Kiran Packer, PT Physical Therapist           Time Calculation:         PT Charges       03/07/17 1609 03/07/17 1046       Time Calculation    Start Time 1320  -SC 0825  -SC     PT Received On 03/07/17  -SC 03/07/17  -SC     PT Goal Re-Cert Due Date  03/17/17  -SC     Time Calculation- PT    Total Timed Code Minutes- PT 23 minute(s)  -SC        User Key  (r) = Recorded By, (t) = Taken By, (c) = Cosigned By    Initials Name Provider Type    SC Kiran Packer, PT Physical Therapist          Therapy Charges for Today     Code Description Service Date Service Provider Modifiers Qty    62608056106  PT EVAL MOD COMPLEXITY 4 3/7/2017 Kiran A Birdie, PT GP 1    [id number]  PT THER SUPP EA 15 MIN 3/7/2017 Kiran A Birdie, PT GP 1    92512000783  GAIT TRAINING EA 15 MIN 3/7/2017 Kiran A Birdie, PT GP 1    35090007107 HC PT THER PROC EA 15 MIN 3/7/2017 Kiran A Birdie, PT GP 1    [id number]  PT THER  SUPP EA 15 MIN 3/7/2017 Kiran Packer, PT GP 1          PT G-Codes  Outcome Measure Options: AM-PAC 6 Clicks Basic Mobility (PT)    PT Discharge Summary  Anticipated Discharge Disposition: home with home health  Reason for Discharge: All goals achieved  Outcomes Achieved: Able to achieve all goals within established timeline  Discharge Destination: Home with home health, Home with assist    Kiran Packer, PT  3/7/2017

## 2017-03-07 NOTE — PLAN OF CARE
Problem: Patient Care Overview (Adult)  Goal: Plan of Care Review  Outcome: Ongoing (interventions implemented as appropriate)    03/07/17 0621 03/07/17 0623   Coping/Psychosocial Response Interventions   Plan Of Care Reviewed With --  patient   Patient Care Overview   Progress --  improving   Outcome Evaluation   Outcome Summary/Follow up Plan Pt. rested well with some pain. He was well controlled well with good PO. --

## 2017-03-10 LAB
ABO + RH BLD: NORMAL
ABO + RH BLD: NORMAL
BH BB BLOOD EXPIRATION DATE: NORMAL
BH BB BLOOD EXPIRATION DATE: NORMAL
BH BB BLOOD TYPE BARCODE: 5100
BH BB BLOOD TYPE BARCODE: 5100
BH BB DISPENSE STATUS: NORMAL
BH BB DISPENSE STATUS: NORMAL
BH BB PRODUCT CODE: NORMAL
BH BB PRODUCT CODE: NORMAL
BH BB UNIT NUMBER: NORMAL
BH BB UNIT NUMBER: NORMAL
CROSSMATCH INTERPRETATION: NORMAL
CROSSMATCH INTERPRETATION: NORMAL
UNIT  ABO: NORMAL
UNIT  ABO: NORMAL
UNIT  RH: NORMAL
UNIT  RH: NORMAL

## 2018-12-28 NOTE — PROGRESS NOTES
"Kenan Do  [id number]  1960    Visit Vitals   • /52 (BP Location: Right arm, Patient Position: Lying)   • Pulse 81   • Temp 100.1 °F (37.8 °C) (Tympanic)   • Resp 18   • Ht 72\" (182.9 cm)   • Wt 176 lb (79.8 kg)   • SpO2 95%   • BMI 23.87 kg/m2       Lab Results (last 24 hours)     Procedure Component Value Units Date/Time    POC Glucose Fingerstick [[id number]]  (Normal) Collected:  03/06/17 1326    Specimen:  Blood Updated:  03/06/17 1332     Glucose 124 mg/dL     Narrative:       Meter: [id number] : [id number] Callum Zaragoza    CBC (No Diff) [[id number]]  (Abnormal) Collected:  03/06/17 1732    Specimen:  Blood Updated:  03/06/17 1750     WBC 5.65 10*3/mm3      RBC 3.00 (L) 10*6/mm3      Hemoglobin 10.0 (L) g/dL      Hematocrit 29.0 (L) %      MCV 96.7 fL      MCH 33.3 (H) pg      MCHC 34.5 g/dL      RDW 12.3 %      RDW-SD 43.7 fl      MPV 9.3 fL      Platelets 78 (L) 10*3/mm3     POC Glucose Fingerstick [[id number]]  (Abnormal) Collected:  03/06/17 1803    Specimen:  Blood Updated:  03/06/17 1805     Glucose 144 (H) mg/dL     Narrative:       Meter: [id number] : [id number] Cayden HILTON    Basic Metabolic Panel [[id number]]  (Abnormal) Collected:  03/06/17 1733    Specimen:  Blood Updated:  03/06/17 1826     Glucose 128 (H) mg/dL      BUN 6 (L) mg/dL      Creatinine 0.50 (L) mg/dL      Sodium 139 mmol/L      Potassium 4.0 mmol/L      Chloride 106 mmol/L      CO2 23.0 mmol/L      Calcium 9.2 mg/dL      eGFR Non African Amer >150 mL/min/1.73      BUN/Creatinine Ratio 12.0      Anion Gap 10.0 mmol/L     Narrative:       National Kidney Foundation Guidelines    Stage                           Description                             GFR                      1                               Normal or High                          90+  2                               Mild decrease                            60-89  3                               Moderate decrease                   30-59  4      "                          Severe decrease                       15-29  5                               Kidney failure                             <15    Fibrinogen [[id number]]  (Abnormal) Collected:  03/06/17 1733    Specimen:  Blood Updated:  03/06/17 1840     Fibrinogen 136 (L) mg/dL     POC Glucose Fingerstick [[id number]]  (Abnormal) Collected:  03/06/17 2219    Specimen:  Blood Updated:  03/06/17 2239     Glucose 233 (H) mg/dL     Narrative:       Meter: [id number] : [id number] Herrera BLEVINS    CBC & Differential [[id number]] Collected:  03/07/17 0517    Specimen:  Blood Updated:  03/07/17 0637    Narrative:       The following orders were created for panel order CBC & Differential.  Procedure                               Abnormality         Status                     ---------                               -----------         ------                     CBC Auto Differential[[id number]]         Abnormal            Final result                 Please view results for these tests on the individual orders.    CBC Auto Differential [[id number]]  (Abnormal) Collected:  03/07/17 0517    Specimen:  Blood Updated:  03/07/17 0637     WBC 3.43 (L) 10*3/mm3      RBC 2.62 (L) 10*6/mm3      Hemoglobin 9.0 (L) g/dL      Hematocrit 25.4 (L) %      MCV 96.9 fL      MCH 34.4 (H) pg      MCHC 35.4 g/dL      RDW 12.3 %      RDW-SD 43.7 fl      MPV 10.1 fL      Platelets 56 (L) 10*3/mm3      Neutrophil % 64.4 %      Lymphocyte % 19.2 (L) %      Monocyte % 14.6 (H) %      Eosinophil % 1.2 %      Basophil % 0.3 %      Immature Grans % 0.3 %      Neutrophils, Absolute 2.21 10*3/mm3      Lymphocytes, Absolute 0.66 10*3/mm3      Monocytes, Absolute 0.50 10*3/mm3      Eosinophils, Absolute 0.04 (L) 10*3/mm3      Basophils, Absolute 0.01 10*3/mm3      Immature Grans, Absolute 0.01 10*3/mm3     Basic Metabolic Panel [[id number]]  (Abnormal) Collected:  03/07/17 0517    Specimen:  Blood Updated:  03/07/17 0711     Glucose 139 (H) mg/dL       BUN 10 mg/dL      Creatinine 0.50 (L) mg/dL      Sodium 134 mmol/L      Potassium 3.6 mmol/L      Chloride 101 mmol/L      CO2 29.0 mmol/L      Calcium 8.8 mg/dL      eGFR Non African Amer >150 mL/min/1.73      BUN/Creatinine Ratio 20.0      Anion Gap 4.0 mmol/L     Narrative:       National Kidney Foundation Guidelines    Stage                           Description                             GFR                      1                               Normal or High                          90+  2                               Mild decrease                            60-89  3                               Moderate decrease                   30-59  4                               Severe decrease                       15-29  5                               Kidney failure                             <15    POC Glucose Fingerstick [[id number]]  (Abnormal) Collected:  03/07/17 0742    Specimen:  Blood Updated:  03/07/17 0744     Glucose 140 (H) mg/dL     Narrative:       Verify with Lab Meter: [id number] : [id number] Donald Robbins          Patient Care Team:  William Wilson MD as PCP - General (Internal Medicine)    SUBJECTIVE  Pain well controlled.  Good start in physical therapy.    PHYSICAL EXAM  Incision benign  Minimal thigh swelling  Neurovascularly intact to knees and toes    Principal Problem:    Status post total hip replacement, right anterior  Active Problems:    DM (diabetes mellitus)    GERD (gastroesophageal reflux disease)    Tobacco abuse    Arthritis of right hip    Alcohol abuse    Cirrhosis      PLAN / DISPOSITION:  Hemoglobin 9.0 today - no transfusion anticipated  Platelets 56k - No signs of continued active bleeding.  Discharge home today or tomorrow depending on home situation and clearance by PT.    Yoel Brewster MD  03/07/17  9:22 AM   No

## (undated) DEVICE — FLTR HME STR UNIV W/SMPL PORT

## (undated) DEVICE — ST INF 2NDARY 20DRP VNT/NOVNT 30IN

## (undated) DEVICE — PK MAJ TOTL HIP ANT 10

## (undated) DEVICE — ANTIBACTERIAL UNDYED BRAIDED (POLYGLACTIN 910), SYNTHETIC ABSORBABLE SUTURE: Brand: COATED VICRYL

## (undated) DEVICE — CANNULA,ADULT,SOFT-TOUCH,7TUBE,SC: Brand: MEDLINE

## (undated) DEVICE — GLV SURG SIGNATURE TOUCH PF LTX 8 STRL BX/50

## (undated) DEVICE — BLOOD TRANSFUSION FILTER: Brand: HAEMONETICS

## (undated) DEVICE — MEDI-VAC NON-CONDUCTIVE SUCTION TUBING: Brand: CARDINAL HEALTH

## (undated) DEVICE — COVER,LIGHT HANDLE,FLX,1/PK: Brand: MEDLINE INDUSTRIES, INC.

## (undated) DEVICE — BNDG ELAS CO-FLEX SLF ADHR 4IN5YD LF STRL

## (undated) DEVICE — SYR LUERLOK 50ML

## (undated) DEVICE — ADAPT ST INFUS ADMIN SYR 70IN

## (undated) DEVICE — INTENDED USE FOR SURGICAL MARKING ON INTACT SKIN, ALSO PROVIDES A PERMANENT METHOD OF IDENTIFYING OBJECTS IN THE OPERATING ROOM: Brand: WRITESITE® REGULAR TIP SKIN MARKER

## (undated) DEVICE — SYS SKIN CLS DERMABOND PRINEO W/22CM MESH TP

## (undated) DEVICE — AIRWY SZ11

## (undated) DEVICE — BG TRANSF W/COUPLER SPK 600ML

## (undated) DEVICE — MEDI-VAC YANKAUER SUCTION HANDLE W/BULBOUS TIP: Brand: CARDINAL HEALTH

## (undated) DEVICE — ENCORE® LATEX MICRO SIZE 8, STERILE LATEX POWDER-FREE SURGICAL GLOVE: Brand: ENCORE

## (undated) DEVICE — KT FAST STRT ATF120